# Patient Record
Sex: MALE | Race: WHITE
[De-identification: names, ages, dates, MRNs, and addresses within clinical notes are randomized per-mention and may not be internally consistent; named-entity substitution may affect disease eponyms.]

---

## 2024-03-17 ENCOUNTER — HOSPITAL ENCOUNTER (EMERGENCY)
Age: 59
Discharge: ANOTHER HOSPITAL | End: 2024-03-17
Payer: MEDICAID

## 2024-03-17 VITALS
DIASTOLIC BLOOD PRESSURE: 89 MMHG | TEMPERATURE: 98.4 F | RESPIRATION RATE: 22 BRPM | HEART RATE: 107 BPM | OXYGEN SATURATION: 96 % | SYSTOLIC BLOOD PRESSURE: 173 MMHG

## 2024-03-17 VITALS
OXYGEN SATURATION: 96 % | TEMPERATURE: 97.88 F | SYSTOLIC BLOOD PRESSURE: 178 MMHG | DIASTOLIC BLOOD PRESSURE: 64 MMHG | RESPIRATION RATE: 24 BRPM | HEART RATE: 94 BPM

## 2024-03-17 VITALS
HEART RATE: 100 BPM | RESPIRATION RATE: 20 BRPM | SYSTOLIC BLOOD PRESSURE: 205 MMHG | DIASTOLIC BLOOD PRESSURE: 113 MMHG | OXYGEN SATURATION: 96 % | TEMPERATURE: 98 F

## 2024-03-17 VITALS
SYSTOLIC BLOOD PRESSURE: 205 MMHG | OXYGEN SATURATION: 95 % | RESPIRATION RATE: 20 BRPM | HEART RATE: 100 BPM | DIASTOLIC BLOOD PRESSURE: 113 MMHG

## 2024-03-17 VITALS
HEART RATE: 96 BPM | RESPIRATION RATE: 18 BRPM | TEMPERATURE: 98.4 F | DIASTOLIC BLOOD PRESSURE: 98 MMHG | SYSTOLIC BLOOD PRESSURE: 199 MMHG | OXYGEN SATURATION: 95 %

## 2024-03-17 VITALS
OXYGEN SATURATION: 96 % | TEMPERATURE: 97.52 F | RESPIRATION RATE: 18 BRPM | HEART RATE: 99 BPM | DIASTOLIC BLOOD PRESSURE: 64 MMHG | SYSTOLIC BLOOD PRESSURE: 178 MMHG

## 2024-03-17 VITALS
OXYGEN SATURATION: 95 % | SYSTOLIC BLOOD PRESSURE: 181 MMHG | DIASTOLIC BLOOD PRESSURE: 101 MMHG | HEART RATE: 96 BPM | RESPIRATION RATE: 20 BRPM

## 2024-03-17 VITALS
SYSTOLIC BLOOD PRESSURE: 205 MMHG | TEMPERATURE: 98.96 F | DIASTOLIC BLOOD PRESSURE: 113 MMHG | HEART RATE: 100 BPM | OXYGEN SATURATION: 96 % | RESPIRATION RATE: 20 BRPM | BODY MASS INDEX: 48.2 KG/M2

## 2024-03-17 VITALS
SYSTOLIC BLOOD PRESSURE: 199 MMHG | HEART RATE: 95 BPM | RESPIRATION RATE: 14 BRPM | OXYGEN SATURATION: 96 % | DIASTOLIC BLOOD PRESSURE: 98 MMHG

## 2024-03-17 DIAGNOSIS — Z86.73: ICD-10-CM

## 2024-03-17 DIAGNOSIS — F17.290: ICD-10-CM

## 2024-03-17 DIAGNOSIS — E66.01: ICD-10-CM

## 2024-03-17 DIAGNOSIS — I63.233: Primary | ICD-10-CM

## 2024-03-17 LAB
ALANINE AMINOTRANSFER ALT/SGPT: 46 U/L (ref 16–61)
ALBUMIN SERPL-MCNC: 3.9 G/DL (ref 3.2–5)
ALKALINE PHOSPHATASE: 114 U/L (ref 45–117)
ANION GAP: 8 (ref 5–15)
APTT PPP: 26.6 SECONDS (ref 24.1–36.2)
AST(SGOT): 33 U/L (ref 15–37)
BILIRUB DIRECT SERPL-MCNC: 0.25 MG/DL (ref 0–0.3)
BILIRUB UR QL STRIP: 1 MG/DL
BUN SERPL-MCNC: 14 MG/DL (ref 7–18)
BUN/CREAT RATIO: 12.8 RATIO (ref 10–20)
CALCIUM SERPL-MCNC: 9.1 MG/DL (ref 8.5–10.1)
CARBON DIOXIDE: 24 MMOL/L (ref 21–32)
CHLORIDE: 108 MMOL/L (ref 98–107)
DEPRECATED RDW RBC: 42.8 FL (ref 35.1–43.9)
ERYTHROCYTE [DISTWIDTH] IN BLOOD: 13.2 % (ref 11.6–14.6)
EST GLOM FILT RATE - AFR AMER: 89 ML/MIN (ref 60–?)
ESTIMATED CREATININE CLEARANCE: 108.12 ML/MIN
GLOBULIN: 4.2 G/DL (ref 2.2–4.2)
GLUCOSE, DIPSTICK: 50 MG/DL
GLUCOSE: 177 MG/DL (ref 74–106)
HCT VFR BLD AUTO: 47.2 % (ref 40–54)
HGB BLD-MCNC: 15.7 G/DL (ref 13–16.5)
IMMATURE GRANULOCYTES COUNT: 0.14 X10^3/UL (ref 0–0)
KETONE-DIPSTICK: 150 MG/DL
LEUKOCYTE ESTERASE UR QL STRIP: 100 /UL
MCV RBC: 90.6 FL (ref 80–94)
MEAN CORP HGB CONC: 33.3 G/DL (ref 32–36)
MEAN PLATELET VOL.: 10.9 FL (ref 6.2–12)
MUCOUS THREADS URNS QL MICRO: (no result) /HPF
NRBC FLAGGED BY ANALYZER: 0 % (ref 0–5)
PLATELET # BLD: 173 K/MM3 (ref 150–450)
POTASSIUM: 4.2 MMOL/L (ref 3.5–5.1)
PROT UR QL STRIP.AUTO: 100 MG/DL
PROTHROMBIN TIME (PROTIME)PT.: 13.4 SECONDS (ref 11.7–14.9)
RBC # BLD AUTO: 5.21 M/MM3 (ref 4.6–6.2)
RBC UR QL: (no result) /HPF (ref 0–5)
RBC UR QL: 50 /UL
SP GR UR: 1.01 (ref 1–1.03)
SQUAMOUS URNS QL MICRO: (no result) /HPF (ref 0–5)
TROPONIN-I HS: 116 PG/ML (ref 3–78)
URINE PRESERVATIVE: (no result)
WBC # BLD AUTO: 16.9 K/MM3 (ref 4.4–11)

## 2024-03-17 PROCEDURE — 70450 CT HEAD/BRAIN W/O DYE: CPT

## 2024-03-17 PROCEDURE — 70498 CT ANGIOGRAPHY NECK: CPT

## 2024-03-17 PROCEDURE — 80048 BASIC METABOLIC PNL TOTAL CA: CPT

## 2024-03-17 PROCEDURE — 81001 URINALYSIS AUTO W/SCOPE: CPT

## 2024-03-17 PROCEDURE — 85025 COMPLETE CBC W/AUTO DIFF WBC: CPT

## 2024-03-17 PROCEDURE — 99285 EMERGENCY DEPT VISIT HI MDM: CPT

## 2024-03-17 PROCEDURE — 84484 ASSAY OF TROPONIN QUANT: CPT

## 2024-03-17 PROCEDURE — 85610 PROTHROMBIN TIME: CPT

## 2024-03-17 PROCEDURE — 80076 HEPATIC FUNCTION PANEL: CPT

## 2024-03-17 PROCEDURE — 93005 ELECTROCARDIOGRAM TRACING: CPT

## 2024-03-17 PROCEDURE — 70496 CT ANGIOGRAPHY HEAD: CPT

## 2024-03-17 PROCEDURE — 71045 X-RAY EXAM CHEST 1 VIEW: CPT

## 2024-03-17 PROCEDURE — 51702 INSERT TEMP BLADDER CATH: CPT

## 2024-03-17 PROCEDURE — 85730 THROMBOPLASTIN TIME PARTIAL: CPT

## 2024-03-17 PROCEDURE — A4216 STERILE WATER/SALINE, 10 ML: HCPCS

## 2024-03-17 NOTE — EX.ED.DYSGE1
HPI
History of Present Illness
Chief Complaint: Unresponsive
Informant: family and EMS
Narrative
Narrative: 
58-year-old male presenting to the emergency room with the chief complaint of unresponsiveness.  EMS brings the patient in after his mother whom he lives with called EMS for being unresponsive.  Mom states that at 1900 hrs. last night the patient 
walked by her stating that he was going to go to bed.  He did seem to be at his baseline at that time.  Today he had not gotten out of bed mom heard him snoring and when checked on him found that he was incontinent of urine and was unable to be 
awoken.  EMS was called.  She notes that he has had prior strokes with left-sided deficits in the past.  His wife  several years ago and since that time he is reportedly become depressed and not taking care of himself or seeing a doctor.  He has 
2 adult children whom he does not have a relationship with per his mother.

[facility]
Medical History (Reviewed 24 @ 15:47 by Dr. Andrew Ro DO)

Stroke




Allergy/AdvReac Type Severity Reaction Status Date / Time
Penicillins Allergy Severe Anaphylaxis Verified 24 14:42


Social History (Reviewed 24 @ 15:47 by Dr. Andrew Ro DO)
Smoking Status:  Current every day smoker tobacco type: pipe 



ROS
ROS ED
Review of Systems
ROS Unobtainable: due to mental status

EXAM
Physical Exam
Const
Vital Signs: 



 24
14:29 24
14:28 24
14:38
Temperature 98 F 99 F 
Temperature Source Temporal Temporal 
Pulse Rate 100 100 100
Respiratory Rate 20 H 20 H 20 H
Blood Pressure 205/113 H 205/113 H 205/113 H
Blood Pressure Mean 143 143 143
Pulse Ox 96 96 95
Oxygen Delivery Method Room Air Room Air 
Oxygen Flow Rate (L/min)   

 24
14:38 24
14:39 24
14:58
Temperature   
Temperature Source   
Pulse Rate  96 95
Respiratory Rate  20 H 14
Blood Pressure  181/101 H 199/98 H
Blood Pressure Mean  127 131
Pulse Ox  95 96
Oxygen Delivery Method Room Air Room Air Room Air
Oxygen Flow Rate (L/min)   

 24
15:08 24
15:30
Temperature 98.4 F 98.4 F
Temperature Source Temporal Temporal
Pulse Rate 96 107 H
Respiratory Rate 18 22 H
Blood Pressure 199/98 H 173/89 H
Blood Pressure Mean 131 117
Pulse Ox 95 96
Oxygen Delivery Method Room Air Nasal Cannula
Oxygen Flow Rate (L/min)  2



Positive well nourished, well developed and obese
General Appearance ED: well developed
Nutritional Appearance: obese
HEENT
Reports normocephalic, head/scalp atraumatic and moist mucous membranes
Eyes
Eyes Narrative: 
There appears to be an eye deviation towards the right.  I am able to get his eyes to start to come towards midline.  I question whether or not he has a vision deficit on the right eye as he does not blink when I can front him.  Pupils are 3-2 
bilaterally
Neck
no lymphadenopathy, supple and no JVD
Resp
Resp Narrative: 
Lung auscultation shows upper airway rhonchi.  He appears to have a degree of labored breathing while laying flat.  
Cardio
regular rate, regular rhythm and no murmurs
GI
normal to inspection, nondistended, normoactive bowel sounds and non-tender
Palpation: soft
Back/Spine
no CVA tenderness and normal ROM
Extremity
General Extremety ED: Yes edema
General Extremity: edema bilateral lower extremity Details: mild
Neuro
Sensorium / Orientation: lethargic
Skin
no rashes or lesions noted

MDM
MDM
MDM Narrative
Medical decision making narrative: 
After the initial evaluation in the resuscitation room and speaking with his mother and determining that he was last seen at 1900 hrs. a stroke team was called.  CT on my read shows changes on the CT CTA consistent with LVO and acute stroke.  I 
spoke with the family.  There was a long discussion regarding whether or not he would want any treatment what type of treatment he would want and how best to proceed.  I spoke with [facility] neurology and with teleradiology several times.  My independent 
interpretation of the chest x-ray is no definitive infiltrate.  White count is elevated 16.9 hemoglobin 15.7 and platelet count of 173.  Creatinine 1.09.  Glucose of 177.  Troponin 116.  His EKG appears to be a sinus rhythm but I cannot fully 
exclude some intermittent A-fib.  Patient's blood pressures have been elevated but not requiring any treatment.  The plan at this point is to transfer the patient to [facility] for thrombectomy.  Due to weather patterns air assets are not available.  [facility] 
requested rectal aspirin.  At this point I do not believe the patient is in needing of intubation.  His breathing has improved while he is in a 30 degree angle.

On a social standpoint I did express to [facility] his depression and the fact that he does not currently have Medicare or Medicaid and that he would most likely benefit and the family would benefit from speaking with social work and mental health 
professionals.
History & Record Review
Discussion w/independent historian: EMS personnel and Family
Lab Data
Attestation: I reviewed the patient's lab results.
Labs: 

Laboratory Results - last 24 hr

  24
  14:30
WBC  16.9 H
RBC  5.21
Hgb  15.7
Hct  47.2
MCV  90.6
MCH  30.1
MCHC  33.3
RDW Std Deviation  42.8
RDW Coeff of Paula  13.2
Plt Count  173
MPV  10.9
Immature Gran % (Auto)  0.800
Neut % (Auto)  90.4 H
Lymph % (Auto)  4.3 L
Mono % (Auto)  4.1
Eos % (Auto)  0.0
Baso % (Auto)  0.4
Absolute Neuts (auto)  15.3 H
Absolute Lymphs (auto)  0.72 L
Nucleated RBC %  0
PT  13.4
INR  1.0
APTT  26.6
Sodium  140
Potassium  4.2
Chloride  108 H
Carbon Dioxide  24.0
Anion Gap  8
BUN  14
Creatinine  1.09
Estim Creat Clear Calc  108.12
Est GFR (MDRD) Af Amer  89
Est GFR (MDRD) Non-Af  74
BUN/Creatinine Ratio  12.8
Glucose  177 H
Calcium  9.1
Total Bilirubin  0.80
Direct Bilirubin  0.25
AST  33
ALT  46
Alkaline Phosphatase  114
Troponin I High Sens  116 H
Total Protein  8.1
Albumin  3.9
Globulin  4.2



Radiography
Diagnostic Testing: 

Clinical Impression(s) from Imaging Studies

Brain CT  24 14:38
IMPRESSION:
There is a left MCA territory and right frontal parietal lobe acute
ischemic infarction.
 
Critical finding called and case discussed. 3/17/2024 3:01 PM Jayjay MONTOYA : The above Results were Read Back by Jb Humphrey MD to Andrew Ro DO, and understanding confirmed on 2024 15:01:27 (ET).
 
Electronically Signed:
Jb Humphrey MD
 at 15:02 EDT
Reading Location ID and State: [address] / [place]
Tel [phone], Service support  [phone], Fax [phone]
 

ADDENDUM: 24 1509
IMPRESSION:
There is a left MCA territory and right frontal parietal lobe acute
ischemic infarction.
 
Critical finding called and case discussed. 3/17/2024 3:01 PM Jayjay MONTOYA : The above Results were Read Back by Jb Humphrey MD to Andrew Ro DO, and understanding confirmed on 2024 15:01:27 (ET).
 
Electronically Signed:
Jb Humphrey MD
 at 15:02 EDT
Reading Location ID and State: [address] / [place]
Tel [phone], Service support  [phone], Fax [phone]
 


Head/Neck CTA  24 14:38
IMPRESSION:
 
1.  There is severe stenosis of the RIGHT A2 segments and distal branches
of the anterior cerebral arteries.
 
2.  There is mild atherosclerotic plaque formation of the origin of the
right internal carotid artery with less than 50% cross sectional diameter
stenosis. ALL ABOVE CRITERIA BY NASCET.
 
3.  There is mild atherosclerotic plaque formation of the origin of the
left internal carotid artery with less than 50% cross sectional diameter
stenosis. ALL ABOVE CRITERIA BY NASCET.
 
4.  There is calcified plaque formation of the right cavernous carotid
artery, with a mild stenosis (less than 50%). ALL ABOVE CRITERIA BY NASCET.
 
5.  There is calcified plaque formation of the left cavernous carotid
artery, with a mild stenosis (less than 50%). ALL ABOVE CRITERIA BY NASCET.
 
6.  There is irregularity of the left M1 branches with occlusion of the
left M2 branch.
 
Electronically Signed:
Jb Humphrey MD
 at 15:09 EDT
Reading Location ID and State: [address] / [place]
Tel [phone], Service support  [phone], Fax [phone]
 

ADDENDUM: 24 1532
IMPRESSION:
 
1.  There is severe stenosis of the RIGHT A2 segments and distal branches
of the anterior cerebral arteries.
 
2.  There is mild atherosclerotic plaque formation of the origin of the
right internal carotid artery with less than 50% cross sectional diameter
stenosis. ALL ABOVE CRITERIA BY NASCET.
 
3.  There is mild atherosclerotic plaque formation of the origin of the
left internal carotid artery with less than 50% cross sectional diameter
stenosis. ALL ABOVE CRITERIA BY NASCET.
 
4.  There is calcified plaque formation of the right cavernous carotid
artery, with a mild stenosis (less than 50%). ALL ABOVE CRITERIA BY NASCET.
 
5.  There is calcified plaque formation of the left cavernous carotid
artery, with a mild stenosis (less than 50%). ALL ABOVE CRITERIA BY NASCET.
 
6.  There is irregularity of the left M1 branches with occlusion of the
left M2 branch.
 
N.B. : The above Results were Read Back by Jb Humphrey MD to Andrew Ro DO, and understanding confirmed on 2024 15:25:28 (ET).
 
Electronically Signed:
Jb Humphrey MD
 at 15:09 EDT
Reading Location ID and State: [address] / [place]
Tel [phone], Service support  [phone], Fax [phone]
 


Chest X-Ray  24 15:05
IMPRESSION:
No radiographic evidence of acute cardiopulmonary disease.
 
Electronically Signed:
Jb Humphrey MD
 at 15:21 EDT
Reading Location ID and State: [address] / [place]
Tel [phone], Service support  [phone], Fax [phone]
 




EKG
Initial EKG: 
      Attestation: I personally reviewed and interpreted this EKG as follows:
      Comments: Sinus rhythm ventricular rate of 96 bpm
Management
Discussion w/another healthcare provider: Consultant and Radiologist

Critical Care Time
Critical Care Time: Yes
Critical care time (excluding procedures): 30-74 minutes (35 min), Including time spent:, Discussing w/Patient &/or Family/Care Giver, Discussing w/Consultants (Radiology x 2 [facility] Neurology x 2 Transfer Line x ), Arranging Admission or Transfer and 
Performing Direct Patient Care at Bedside

Discharge Plan
Triage
Chief Complaint: Unresponsive

ED Provider: Andrew Ro

Dx/Rx/DC Orders
Clinical Impression:
 Acute stroke due to ischemia, Morbid obesity, Hypertension


Primary Care Provider: Care Physician,No Primary

Referrals:
Care Physician,No Primary [Primary Care Provider] - 

Disposition
***Disposition***: Acute Care Hospital

Discharge Location: [facility]


NIHSS
NIHSS
1a. Level of Consciousness: Alert; keenly responsive
1b. LOC Questions: Answers neither question correctly.
1c. LOC Commands: Performs both tasks correctly.
2. Best Gaze: Forced deviation,
3. Visual: Complete hemianopia
4. Facial Palsy: Complete paralysis of one or both sides
5a. Left Arm: Drift; arm drifts downward but doesn?t hit the bed
5b. Right Arm: No effort against gravity; arm falls
6a. Left Leg: Some effort against gravity;
6b. Right Leg: No effort against gravity; leg falls to bed immediately
7. Limb Ataxia: Absent
8. Sensory: Severe to total sensory loss;
9. Best Language: Mute, global aphasia;
10. Dysarthria: UN=Intubated or other physical barrier, explain:
11. Extinction and Inattention: Profound arielle-inattention or extinction to more than one modality;
Total: 25
Stroke Questions
Stroke Team Activated: Yes
a.Reviewed Inclusion/Exclusion criteria: Yes
Was Patient considered for Endovascular Intervention?: No (per family - patient would not desire that)
IV Thrombolytic Administered: No
Critical care time (excluding procedures): 30-74 minutes (35 minutes)

## 2024-03-17 NOTE — CT_ITS
REPORT-ID:[id number] 
 
STUDY:  CT BRAIN WITHOUT CONTRAST 
 
REASON FOR EXAM:   Male, 58 years old. Neuro deficit, acute, stroke 
suspected 
 
Individualized dose optimization techniques were used for this CT. 
 
TECHNIQUE:   Transaxial CT imaging of the brain was performed without 
administration of intravenous contrast material. 
 
COMPARISON:   None 
___________________________________ 
FINDINGS: 
There are calcifications noted in the distal vertebral arteries. There are 
calcifications noted in the cavernous carotid arteries. This is consistent 
for atherosclerotic disease.  Normal calvarium. Normal soft tissues. 
 
There is mild cerebral atrophy with widening of the extra-axial spaces and 
ventricular dilatation. There are areas of decreased attenuation within the 
white matter tracts of the supratentorial brain, consistent with 
microvascular disease changes.  Normal basal ganglia and thalami.  Normal 
brainstem.  Old right cerebellar infarct. 
 
There is no intracranial hemorrhage.  There is a left MCA territory and 
right frontal parietal lobe acute ischemic infarction. 
 
Normal visualized paranasal sinuses. 
 
ASPECTS Score for Acute Strokes: 8/10 
___________________________________ 
 
ORDER #: [id number] CT/STROKE Brain/Head without Cont  
IMPRESSION:  
There is a left MCA territory and right frontal parietal lobe acute  
ischemic infarction.  
 
  
Critical finding called and case discussed. 3/17/2024 3:01 PM Jayjay MONTOYA : The above Results were Read Back by Jb Humphrey MD to Andrew Ro DO, and understanding confirmed on 03/17/2024 15:01:27 (ET).  
 
  
Electronically Signed:  
Jb Humphrey MD  
2024/03/17 at 15:02 EDT  
Reading Location ID and State: [address] / [place]  
Tel [phone], Service support  [phone], Fax [phone]

## 2024-03-17 NOTE — EKG12_ITS
Test Reason : POSS STROKE 
Blood Pressure : ***/*** mmHG 
Vent. Rate : 096 BPM     Atrial Rate : 000 BPM 
   P-R Int : 000 ms          QRS Dur : 080 ms 
    QT Int : 358 ms       P-R-T Axes : 000 091 034 degrees 
   QTc Int : 452 ms 
  
NSR 
Rightward axis 
Abnormal ECG 
No previous ECGs available 
Confirmed by DAISY WALTERS, PHIL ([id number]),  DIOGO THRASHER ([id number]) on 3/19/2024 10:47:58 AM 
  
Referred By:             Confirmed By:PHIL VILLA MD

## 2024-03-17 NOTE — ED.RN
CALLED [facility] @ 1520 TO SET UP TRANSPORT FOR PT IF THEY WERE FLYING. THEY CALLED BACK AT 1527 TO TURN DOWN THE FLIGHT DUE TO WEATHER. I ASKED AT THIS TIME TO SET UP [facility] TRANSPORT INSTEAD. THEY STATED THEY WOULD CHECK WITH THEIR 
SQUADS AND CALL US BACK. I CALLED [facility] @ 1530 TO ARRANGE TRANSPORT FOR PT WHILE AWAITING [email or web address]'S RESPONSE. DISPATCH GAVE US AN ETA OF 15 MIN (1545). I SPOKE TO [email or web address] DISPATCH AT 1534 TO LET THEM KNOW WE HAD TRANSPORT 
ARRANGED.

## 2024-03-17 NOTE — CT_ITS
**We are attempting to reach an attending provider to discuss findings. An 
addendum with communication details will be sent when the communication is 
complete. ** 
 
REPORT-ID:[id number] 
 
EXAM:  CT ANGIOGRAPHY HEAD AND NECK WITH INTRAVENOUS CONTRAST 
 
CLINICAL INDICATION:  Neuro deficit, acute, stroke suspected 
 
TECHNIQUE:  [place] [facility] [place]/head and neck CT angiography protocol 
performed with intravenous contrast.  This CT exam was performed using one 
or more of the following dose reduction techniques:  automated exposure 
control, adjustment of the mA and/or kV according to patient size, and/or 
use of iterative reconstruction technique.  MIP reconstructed images were 
created and reviewed. 
 
CONTRAST:  IV 100mL [id number] 
 
RADIATION DOSE:  CTDIvol = 21.17 mGy, DLP = 862.18 mGy-cm 
 
COMPARISON:  No relevant prior studies available. 
 
FINDINGS: 
HEAD: 
 
RIGHT ANTERIOR CEREBRAL ARTERY:  There is severe stenosis of the RIGHT A2 
segments and distal branches of the anterior cerebral arteries.  Anterior 
communicating artery is present.  No aneurysm. 
 
RIGHT MIDDLE CEREBRAL ARTERY:  Unremarkable.  No occlusion or significant 
stenosis.  No aneurysm. 
 
RIGHT POSTERIOR CEREBRAL ARTERY:  Unremarkable.  No occlusion or 
significant stenosis.  No aneurysm. 
 
RIGHT INTRACRANIAL INTERNAL CAROTID ARTERY:  Unremarkable.  No significant 
stenosis.  No dissection or occlusion. 
 
RIGHT INTRACRANIAL VERTEBRAL ARTERY:  Unremarkable.  No significant 
stenosis.  No dissection or occlusion. 
 
LEFT ANTERIOR CEREBRAL ARTERY:  Unremarkable.  No occlusion or significant 
stenosis.  No aneurysm. 
 
LEFT MIDDLE CEREBRAL ARTERY:  There is irregularity of the left M1 branches 
with occlusion of the left M2 branch. There is irregularity of the left M1 
and M2 branches with minimal luminal narrowing, suggesting atherosclerotic 
plaque formation.  No aneurysm. 
 
LEFT POSTERIOR CEREBRAL ARTERY:  Unremarkable.  No occlusion or significant 
stenosis.  No aneurysm. 
 
LEFT INTRACRANIAL INTERNAL CAROTID ARTERY:  Unremarkable.  No significant 
stenosis.  No dissection or occlusion. 
 
LEFT INTRACRANIAL VERTEBRAL ARTERY:  Unremarkable.  No significant 
stenosis.  No dissection or occlusion. 
 
BASILAR ARTERY:  Unremarkable.  No occlusion or significant stenosis.  No 
aneurysm. 
 
OTHER VASCULATURE:  There is calcified plaque formation of the right 
cavernous carotid artery, with a mild stenosis (less than 50%). ALL ABOVE 
CRITERIA BY NASCET.  There is calcified plaque formation of the left 
cavernous carotid artery, with a mild stenosis (less than 50%). ALL ABOVE 
CRITERIA BY NASCET.  No vascular malformation. 
 
NECK: 
 
RIGHT COMMON CAROTID ARTERY:  Unremarkable.  No significant stenosis.  No 
dissection or occlusion. 
 
RIGHT EXTRACRANIAL INTERNAL CAROTID ARTERY:  There is mild atherosclerotic 
plaque formation of the origin of the right internal carotid artery with 
less than 50% cross sectional diameter stenosis. ALL ABOVE CRITERIA BY 
NASCET.  No dissection or occlusion. 
 
RIGHT EXTERNAL CAROTID ARTERY:  Unremarkable.  No occlusion. 
 
RIGHT EXTRACRANIAL VERTEBRAL ARTERY:  Unremarkable.  No significant 
stenosis.  No dissection or occlusion. 
 
LEFT COMMON CAROTID ARTERY:  Unremarkable.  No significant stenosis.  No 
dissection or occlusion. 
 
LEFT EXTRACRANIAL INTERNAL CAROTID ARTERY:  There is mild atherosclerotic 
plaque formation of the origin of the left internal carotid artery with 
less than 50% cross sectional diameter stenosis. ALL ABOVE CRITERIA BY 
NASCET.  No dissection or occlusion. 
 
LEFT EXTERNAL CAROTID ARTERY:  Unremarkable.  No occlusion. 
 
LEFT EXTRACRANIAL VERTEBRAL ARTERY:  Unremarkable.  No significant 
stenosis.  No dissection or occlusion. 
 
BRACHIOCEPHALIC AND SUBCLAVIAN ARTERIES:  Unremarkable as visualized.  No 
occlusion or significant stenosis. 
 
LUNG APICES:  Unremarkable as visualized. 
 
HEAD and NECK: 
 
BONES/JOINTS:  There are degenerative findings of the cervical spine.  No 
discrete lytic or blastic abnormalities. 
 
SOFT TISSUES:  Unremarkable. 
 
OTHER FINDINGS:  Post-processing of the angiographic images was performed, 
with axial imaging and 3D reconstruction. MIPS images were obtained. 
 
CAROTID STENOSIS REFERENCE USING NASCET CRITERIA: 
% ICA stenosis = (1 - narrowest ICA diameter/diameter of distal cervical 
ICA) x 100. 
Mild - <50% stenosis. 
Moderate - 50-69% stenosis. 
Severe - 70-94% stenosis. 
Near occlusion - 95-99% stenosis. 
Occluded - 100% stenosis. 
 
ORDER #: [id number] CT/STROKE CTA Head AND Neck W/Con  
IMPRESSION:  
 
  
1.  There is severe stenosis of the RIGHT A2 segments and distal branches  
of the anterior cerebral arteries.  
 
  
2.  There is mild atherosclerotic plaque formation of the origin of the  
right internal carotid artery with less than 50% cross sectional diameter  
stenosis. ALL ABOVE CRITERIA BY NASCET.  
 
  
3.  There is mild atherosclerotic plaque formation of the origin of the  
left internal carotid artery with less than 50% cross sectional diameter  
stenosis. ALL ABOVE CRITERIA BY NASCET.  
 
  
4.  There is calcified plaque formation of the right cavernous carotid  
artery, with a mild stenosis (less than 50%). ALL ABOVE CRITERIA BY NASCET.  
 
  
5.  There is calcified plaque formation of the left cavernous carotid  
artery, with a mild stenosis (less than 50%). ALL ABOVE CRITERIA BY NASCET.  
 
  
6.  There is irregularity of the left M1 branches with occlusion of the  
left M2 branch.  
 
  
Electronically Signed:  
Jb Humphrey MD  
2024/03/17 at 15:09 EDT  
Reading Location ID and State: [address] / [place]  
Tel [phone], Service support  [phone], Fax [phone]

## 2024-03-17 NOTE — RAD_ITS
REPORT-ID:[id number] 
 
EXAM:  XR CHEST, 1 VIEW 
 
CLINICAL INDICATION:  Neuro deficit, acute, stroke suspected 
 
TECHNIQUE:  Frontal view of the chest. 
 
COMPARISON:  No relevant prior studies available. 
 
FINDINGS: 
 
LUNGS AND PLEURAL SPACES:  Unremarkable.  No consolidation or edema.  No 
pneumothorax.  No effusion. 
 
HEART:  Unremarkable.  Cardiac silhouette not enlarged. 
 
MEDIASTINUM:  Central airways and mediastinal contour are unremarkable. 
 
BONES/JOINTS:  Unremarkable.  No acute fracture. 
 
SOFT TISSUES:  Unremarkable. 
 
ORDER #: [id number] RAD/Chest 1 View  
IMPRESSION:  
No radiographic evidence of acute cardiopulmonary disease.  
 
  
Electronically Signed:  
Jb Humphrey MD  
2024/03/17 at 15:21 EDT  
Reading Location ID and State: [address] / [place]  
Tel [phone], Service support  [phone], Fax [phone]

## 2024-03-17 NOTE — EDS_ITS
HPI    
History of Present Illness    
Chief Complaint: Unresponsive    
Informant: family and EMS    
Narrative    
Narrative:     
58-year-old male presenting to the emergency room with the chief complaint of   
unresponsiveness.  EMS brings the patient in after his mother whom he lives with  
called EMS for being unresponsive.  Mom states that at 1900 hrs. last night the   
patient walked by her stating that he was going to go to bed.  He did seem to be  
at his baseline at that time.  Today he had not gotten out of bed mom heard him   
snoring and when checked on him found that he was incontinent of urine and was   
unable to be awoken.  EMS was called.  She notes that he has had prior strokes   
with left-sided deficits in the past.  His wife  several years ago and since  
that time he is reportedly become depressed and not taking care of himself or   
seeing a doctor.  He has 2 adult children whom he does not have a relationship   
with per his mother.    
    
[facility]    
Medical History (Reviewed 24 @ 15:47 by Dr. Andrew Ro DO)    
    
Stroke    
    
    
                                            
    
    
    
Allergy/AdvReac Type Severity Reaction Status Date / Time    
     
Penicillins Allergy Severe Anaphylaxis Verified 24 14:42    
    
    
    
Social History (Reviewed 24 @ 15:47 by Dr. Andrew Ro DO)    
Smoking Status:  Current every day smoker tobacco type: pipe     
    
    
    
ROS    
ROS ED    
Review of Systems    
ROS Unobtainable: due to mental status    
    
EXAM    
Physical Exam    
Const    
Vital Signs:     
    
                                            
    
    
    
 24    
14:29 24    
14:28 24    
14:38    
     
Temperature 98 F 99 F     
     
Temperature Source Temporal Temporal     
     
Pulse Rate 100 100 100    
     
Respiratory Rate 20 H 20 H 20 H    
     
Blood Pressure 205/113 H 205/113 H 205/113 H    
     
Blood Pressure Mean 143 143 143    
     
Pulse Ox 96 96 95    
     
Oxygen Delivery Method Room Air Room Air     
     
Oxygen Flow Rate (L/min)       
    
    
    
    
 24    
14:38 24    
14:39 24    
14:58    
     
Temperature       
     
Temperature Source       
     
Pulse Rate  96 95    
     
Respiratory Rate  20 H 14    
     
Blood Pressure  181/101 H 199/98 H    
     
Blood Pressure Mean  127 131    
     
Pulse Ox  95 96    
     
Oxygen Delivery Method Room Air Room Air Room Air    
     
Oxygen Flow Rate (L/min)       
    
    
    
    
 24    
15:08 24    
15:30    
     
Temperature 98.4 F 98.4 F    
     
Temperature Source Temporal Temporal    
     
Pulse Rate 96 107 H    
     
Respiratory Rate 18 22 H    
     
Blood Pressure 199/98 H 173/89 H    
     
Blood Pressure Mean 131 117    
     
Pulse Ox 95 96    
     
Oxygen Delivery Method Room Air Nasal Cannula    
     
Oxygen Flow Rate (L/min)  2    
    
    
    
    
Positive well nourished, well developed and obese    
General Appearance ED: well developed    
Nutritional Appearance: obese    
HEENT    
Reports normocephalic, head/scalp atraumatic and moist mucous membranes    
Eyes    
Eyes Narrative:     
There appears to be an eye deviation towards the right.  I am able to get his   
eyes to start to come towards midline.  I question whether or not he has a   
vision deficit on the right eye as he does not blink when I can front him.    
Pupils are 3-2 bilaterally    
Neck    
no lymphadenopathy, supple and no JVD    
Resp    
Resp Narrative:     
Lung auscultation shows upper airway rhonchi.  He appears to have a degree of l  
abored breathing while laying flat.      
Cardio    
regular rate, regular rhythm and no murmurs    
GI    
normal to inspection, nondistended, normoactive bowel sounds and non-tender    
Palpation: soft    
Back/Spine    
no CVA tenderness and normal ROM    
Extremity    
General Extremety ED: Yes edema    
General Extremity: edema bilateral lower extremity Details: mild    
Neuro    
Sensorium / Orientation: lethargic    
Skin    
no rashes or lesions noted    
    
MDM    
MDM    
MDM Narrative    
Medical decision making narrative:     
After the initial evaluation in the resuscitation room and speaking with his   
mother and determining that he was last seen at 1900 hrs. a stroke team was   
called.  CT on my read shows changes on the CT CTA consistent with LVO and acute  
stroke.  I spoke with the family.  There was a long discussion regarding whether  
or not he would want any treatment what type of treatment he would want and how   
best to proceed.  I spoke with [facility] neurology and with teleradiology several   
times.  My independent interpretation of the chest x-ray is no definitive   
infiltrate.  White count is elevated 16.9 hemoglobin 15.7 and platelet count of   
173.  Creatinine 1.09.  Glucose of 177.  Troponin 116.  His EKG appears to be a   
sinus rhythm but I cannot fully exclude some intermittent A-fib.  Patient's   
blood pressures have been elevated but not requiring any treatment.  The plan at  
this point is to transfer the patient to [facility] for thrombectomy.  Due to weather   
patterns air assets are not available.  [facility] requested rectal aspirin.  At this   
point I do not believe the patient is in needing of intubation.  His breathing   
has improved while he is in a 30 degree angle.    
    
On a social standpoint I did express to [facility] his depression and the fact that he   
does not currently have Medicare or Medicaid and that he would most likely   
benefit and the family would benefit from speaking with social work and mental   
health professionals.    
History & Record Review    
Discussion w/independent historian: EMS personnel and Family    
Lab Data    
Attestation: I reviewed the patient's lab results.    
Labs:     
    
                         Laboratory Results - last 24 hr    
    
    
    
  24    
    
  14:30    
     
WBC  16.9 H    
     
RBC  5.21    
     
Hgb  15.7    
     
Hct  47.2    
     
MCV  90.6    
     
MCH  30.1    
     
MCHC  33.3    
     
RDW Std Deviation  42.8    
     
RDW Coeff of Paula  13.2    
     
Plt Count  173    
     
MPV  10.9    
     
Immature Gran % (Auto)  0.800    
     
Neut % (Auto)  90.4 H    
     
Lymph % (Auto)  4.3 L    
     
Mono % (Auto)  4.1    
     
Eos % (Auto)  0.0    
     
Baso % (Auto)  0.4    
     
Absolute Neuts (auto)  15.3 H    
     
Absolute Lymphs (auto)  0.72 L    
     
Nucleated RBC %  0    
     
PT  13.4    
     
INR  1.0    
     
APTT  26.6    
     
Sodium  140    
     
Potassium  4.2    
     
Chloride  108 H    
     
Carbon Dioxide  24.0    
     
Anion Gap  8    
     
BUN  14    
     
Creatinine  1.09    
     
Estim Creat Clear Calc  108.12    
     
Est GFR (MDRD) Af Amer  89    
     
Est GFR (MDRD) Non-Af  74    
     
BUN/Creatinine Ratio  12.8    
     
Glucose  177 H    
     
Calcium  9.1    
     
Total Bilirubin  0.80    
     
Direct Bilirubin  0.25    
     
AST  33    
     
ALT  46    
     
Alkaline Phosphatase  114    
     
Troponin I High Sens  116 H    
     
Total Protein  8.1    
     
Albumin  3.9    
     
Globulin  4.2    
    
    
    
    
Radiography    
Diagnostic Testing:     
    
Clinical Impression(s) from Imaging Studies    
    
Brain CT  24 14:38    
IMPRESSION:    
There is a left MCA territory and right frontal parietal lobe acute    
ischemic infarction.    
     
Critical finding called and case discussed. 3/17/2024 3:01 PM Jayjay MONTOYA : The above Results were Read Back by Jb Humphrey MD to Andrew Ro DO, and understanding confirmed on 2024 15:01:27 (ET).    
     
Electronically Signed:    
Jb Humphrey MD    
 at 15:02 EDT    
Reading Location ID and State: [address] / [place]    
Tel [phone], Service support  [phone], Fax [phone]    
     
    
ADDENDUM: 24 1509    
IMPRESSION:    
There is a left MCA territory and right frontal parietal lobe acute    
ischemic infarction.    
     
Critical finding called and case discussed. 3/17/2024 3:01 PM Jayjay MONTOYA : The above Results were Read Back by Jb Humphrey MD to Andrew Ro DO, and understanding confirmed on 2024 15:01:27 (ET).    
     
Electronically Signed:    
Jb Humphrey MD    
 at 15:02 EDT    
Reading Location ID and State: [address] / [place]    
Tel [phone], Service support  [phone], Fax [phone]    
     
    
    
Head/Neck CTA  24 14:38    
IMPRESSION:    
     
1.  There is severe stenosis of the RIGHT A2 segments and distal branches    
of the anterior cerebral arteries.    
     
2.  There is mild atherosclerotic plaque formation of the origin of the    
right internal carotid artery with less than 50% cross sectional diameter    
stenosis. ALL ABOVE CRITERIA BY NASCET.    
     
3.  There is mild atherosclerotic plaque formation of the origin of the    
left internal carotid artery with less than 50% cross sectional diameter    
stenosis. ALL ABOVE CRITERIA BY NASCET.    
     
4.  There is calcified plaque formation of the right cavernous carotid    
artery, with a mild stenosis (less than 50%). ALL ABOVE CRITERIA BY NASCET.    
     
5.  There is calcified plaque formation of the left cavernous carotid    
artery, with a mild stenosis (less than 50%). ALL ABOVE CRITERIA BY NASCET.    
     
6.  There is irregularity of the left M1 branches with occlusion of the    
left M2 branch.    
     
Electronically Signed:    
Jb Humphrey MD    
 at 15:09 EDT    
Reading Location ID and State: [address] / [place]    
Tel [phone], Service support  [phone], Fax [phone]    
     
    
ADDENDUM: 24 1532    
IMPRESSION:    
     
1.  There is severe stenosis of the RIGHT A2 segments and distal branches    
of the anterior cerebral arteries.    
     
2.  There is mild atherosclerotic plaque formation of the origin of the    
right internal carotid artery with less than 50% cross sectional diameter    
stenosis. ALL ABOVE CRITERIA BY NASCET.    
     
3.  There is mild atherosclerotic plaque formation of the origin of the    
left internal carotid artery with less than 50% cross sectional diameter    
stenosis. ALL ABOVE CRITERIA BY NASCET.    
     
4.  There is calcified plaque formation of the right cavernous carotid    
artery, with a mild stenosis (less than 50%). ALL ABOVE CRITERIA BY NASCET.    
     
5.  There is calcified plaque formation of the left cavernous carotid    
artery, with a mild stenosis (less than 50%). ALL ABOVE CRITERIA BY NASCET.    
     
6.  There is irregularity of the left M1 branches with occlusion of the    
left M2 branch.    
     
N.B. : The above Results were Read Back by Jb Humphrey MD to Andrew Ro DO, and understanding confirmed on 2024 15:25:28 (ET).    
     
Electronically Signed:    
Jb Humphrey MD    
 at 15:09 EDT    
Reading Location ID and State: [address] / [place]    
Tel [phone], Service support  [phone], Fax [phone]    
     
    
    
Chest X-Ray  24 15:05    
IMPRESSION:    
No radiographic evidence of acute cardiopulmonary disease.    
     
Electronically Signed:    
Jb Humphrey MD    
 at 15:21 EDT    
Reading Location ID and State: [address] / [place]    
Tel [phone], Service support  [phone], Fax [phone]    
     
    
    
    
    
EKG    
Initial EKG:     
      Attestation: I personally reviewed and interpreted this EKG as follows:    
      Comments: Sinus rhythm ventricular rate of 96 bpm    
Management    
Discussion w/another healthcare provider: Consultant and Radiologist    
    
Critical Care Time    
Critical Care Time: Yes    
Critical care time (excluding procedures): 30-74 minutes (35 min), Including   
time spent:, Discussing w/Patient &/or Family/Care Giver, Discussing   
w/Consultants (Radiology x 2 [facility] Neurology x 2 Transfer Line x ), Arranging   
Admission or Transfer and Performing Direct Patient Care at Bedside    
    
Discharge Plan    
Triage    
Chief Complaint: Unresponsive    
    
ED Provider: Andrew Ro    
    
Dx/Rx/DC Orders    
Clinical Impression:    
 Acute stroke due to ischemia, Morbid obesity, Hypertension    
    
    
Primary Care Provider: Care Physician,No Primary    
    
Referrals:    
Care Physician,No Primary [Primary Care Provider] -     
    
Disposition    
***Disposition***: Acute Care Hospital    
    
Discharge Location: [facility]    
    
    
NIHSS    
NIHSS    
1a. Level of Consciousness: Alert; keenly responsive    
1b. LOC Questions: Answers neither question correctly.    
1c. LOC Commands: Performs both tasks correctly.    
2. Best Gaze: Forced deviation,    
3. Visual: Complete hemianopia    
4. Facial Palsy: Complete paralysis of one or both sides    
5a. Left Arm: Drift; arm drifts downward but doesn?t hit the bed    
5b. Right Arm: No effort against gravity; arm falls    
6a. Left Leg: Some effort against gravity;    
6b. Right Leg: No effort against gravity; leg falls to bed immediately    
7. Limb Ataxia: Absent    
8. Sensory: Severe to total sensory loss;    
9. Best Language: Mute, global aphasia;    
10. Dysarthria: UN=Intubated or other physical barrier, explain:    
11. Extinction and Inattention: Profound arielle-inattention or extinction to more   
than one modality;    
Total: 25    
Stroke Questions    
Stroke Team Activated: Yes    
a.Reviewed Inclusion/Exclusion criteria: Yes    
Was Patient considered for Endovascular Intervention?: No (per family - patient   
would not desire that)    
IV Thrombolytic Administered: No    
Critical care time (excluding procedures): 30-74 minutes (35 minutes)

## 2024-04-11 ENCOUNTER — HOSPITAL ENCOUNTER (OUTPATIENT)
Age: 59
Discharge: HOME | End: 2024-05-02
Attending: STUDENT IN AN ORGANIZED HEALTH CARE EDUCATION/TRAINING PROGRAM
Payer: COMMERCIAL

## 2024-04-12 LAB
ANION GAP SERPL CALC-SCNC: 12 MEQ/L (ref 8–16)
ANION GAP SERPL CALC-SCNC: 18 MEQ/L (ref 8–16)
BASOPHILS ABSOLUTE: 0.1 THOU/MM3 (ref 0–0.1)
BASOPHILS NFR BLD AUTO: 1.2 %
BUN SERPL-MCNC: 40 MG/DL (ref 7–22)
BUN SERPL-MCNC: 45 MG/DL (ref 7–22)
CALCIUM SERPL-MCNC: 9.4 MG/DL (ref 8.5–10.5)
CALCIUM SERPL-MCNC: 9.5 MG/DL (ref 8.5–10.5)
CHLORIDE SERPL-SCNC: 99 MEQ/L (ref 98–111)
CHLORIDE SERPL-SCNC: 99 MEQ/L (ref 98–111)
CO2 SERPL-SCNC: 24 MEQ/L (ref 23–33)
CO2 SERPL-SCNC: 27 MEQ/L (ref 23–33)
CREAT SERPL-MCNC: 1.3 MG/DL (ref 0.4–1.2)
CREAT SERPL-MCNC: 1.3 MG/DL (ref 0.4–1.2)
DEPRECATED RDW RBC AUTO: 51.1 FL (ref 35–45)
EOSINOPHIL NFR BLD AUTO: 3.4 %
EOSINOPHILS ABSOLUTE: 0.4 THOU/MM3 (ref 0–0.4)
ERYTHROCYTE [DISTWIDTH] IN BLOOD BY AUTOMATED COUNT: 14.6 % (ref 11.5–14.5)
GFR SERPL CREATININE-BSD FRML MDRD: 63 ML/MIN/1.73M2
GFR SERPL CREATININE-BSD FRML MDRD: 63 ML/MIN/1.73M2
GLUCOSE SERPL-MCNC: 126 MG/DL (ref 70–108)
GLUCOSE SERPL-MCNC: 152 MG/DL (ref 70–108)
HCT VFR BLD AUTO: 30.2 % (ref 42–52)
HGB BLD-MCNC: 9.3 GM/DL (ref 14–18)
IMM GRANULOCYTES # BLD AUTO: 0.14 THOU/MM3 (ref 0–0.07)
IMM GRANULOCYTES NFR BLD AUTO: 1.3 %
LACTATE SERPL-SCNC: 1 MMOL/L (ref 0.5–2)
LYMPHOCYTES ABSOLUTE: 2.9 THOU/MM3 (ref 1–4.8)
LYMPHOCYTES NFR BLD AUTO: 26.4 %
MCH RBC QN AUTO: 29.6 PG (ref 26–33)
MCHC RBC AUTO-ENTMCNC: 30.8 GM/DL (ref 32.2–35.5)
MCV RBC AUTO: 96.2 FL (ref 80–94)
MONOCYTES ABSOLUTE: 1 THOU/MM3 (ref 0.4–1.3)
MONOCYTES NFR BLD AUTO: 9.1 %
NEUTROPHILS NFR BLD AUTO: 58.6 %
NRBC BLD AUTO-RTO: 0 /100 WBC
OSMOLALITY SERPL: 305 MOSMOL/KG (ref 275–295)
PLATELET # BLD AUTO: 287 THOU/MM3 (ref 130–400)
PMV BLD AUTO: 11.6 FL (ref 9.4–12.4)
POTASSIUM SERPL-SCNC: 4.8 MEQ/L (ref 3.5–5.2)
POTASSIUM SERPL-SCNC: 4.9 MEQ/L (ref 3.5–5.2)
PREALB SERPL-MCNC: 22.6 MG/DL (ref 20–40)
RBC # BLD AUTO: 3.14 MILL/MM3 (ref 4.7–6.1)
SEGMENTED NEUTROPHILS ABSOLUTE COUNT: 6.4 THOU/MM3 (ref 1.8–7.7)
SODIUM SERPL-SCNC: 138 MEQ/L (ref 135–145)
SODIUM SERPL-SCNC: 141 MEQ/L (ref 135–145)
WBC # BLD AUTO: 10.9 THOU/MM3 (ref 4.8–10.8)

## 2024-04-12 PROCEDURE — 99223 1ST HOSP IP/OBS HIGH 75: CPT | Performed by: INTERNAL MEDICINE

## 2024-04-12 NOTE — CONSULTS
Patient:  [facility]    Unit/Bed:RYANN KNDPOOLRM/NONE  MRN: [id number]   PCP: No primary care provider on file.  Date of Admission: 4/11/2024    Assessment and Plan(All pulmonary edema, renal failure, PE, and respiratory failure diagnoses are acute in nature unless otherwise specified):        Acute hypoxic respiratory failure: secondary to impaired airway protection in setting of CVA. Neurological status precludes consideration for decannulation at this point. Will need to achieve further neurologic improvement prior to considering this. Continue working with PT/OT/SLP. Continue TM oxygen as tolerated. He has been weaned off mechanical ventilator for >24 hours at this point. Copious tracheal secretions preclude start of PMV trials. Avoid scopolamine or related secretion-reduction agents given risk for neurologic effects. No fever or leukocytosis, but will send respiratory culture and gram stain, given high risk for HAP with tracheostomy, and recent MRSA pneumonia.   Subacute ischemic stroke / left M1 occlusion: Status post thrombectomy on 3/17/2024 at [facility]. Initial NIHSS was 24 per report. Was not a TNK candidate given prolonged time since last known well. Concern was for cardioembolic source. Echo did not reveal any valvular disease, and bubble study was negative. On telemetry. Course at [facility] c/b cerebral edema, and petechial hemorrhagic conversion at [facility]. Last CT head 3/19 showed stable petechial hemorrhages, with decreasing mass effect and decreasing midline shift. EEG 4/5 showed no epileptiform activity. Remains with flaccid right-sided extremities and aphasia (patient is mute), but he does follow some selective commands and is awake. Noted baseline functional status is impaired given prior stroke in the past.   Trend neurologic exams. PT/OT. SLP. PEG tube for feeds. On aspirin and statin. On lovenox DVT prophylaxis. On telemetry. Primary service following.   Dysphagia: Has PEG for feeds. SLP

## 2024-04-13 LAB
ANION GAP SERPL CALC-SCNC: 15 MEQ/L (ref 8–16)
BASOPHILS ABSOLUTE: 0.1 THOU/MM3 (ref 0–0.1)
BASOPHILS NFR BLD AUTO: 0.8 %
BUN SERPL-MCNC: 47 MG/DL (ref 7–22)
CALCIUM SERPL-MCNC: 9 MG/DL (ref 8.5–10.5)
CHLORIDE SERPL-SCNC: 98 MEQ/L (ref 98–111)
CO2 SERPL-SCNC: 26 MEQ/L (ref 23–33)
CREAT SERPL-MCNC: 1.3 MG/DL (ref 0.4–1.2)
DEPRECATED RDW RBC AUTO: 50.8 FL (ref 35–45)
EOSINOPHIL NFR BLD AUTO: 3.4 %
EOSINOPHILS ABSOLUTE: 0.3 THOU/MM3 (ref 0–0.4)
ERYTHROCYTE [DISTWIDTH] IN BLOOD BY AUTOMATED COUNT: 14.6 % (ref 11.5–14.5)
GFR SERPL CREATININE-BSD FRML MDRD: 63 ML/MIN/1.73M2
GLUCOSE SERPL-MCNC: 154 MG/DL (ref 70–108)
HCT VFR BLD AUTO: 28.4 % (ref 42–52)
HGB BLD-MCNC: 8.7 GM/DL (ref 14–18)
IMM GRANULOCYTES # BLD AUTO: 0.09 THOU/MM3 (ref 0–0.07)
IMM GRANULOCYTES NFR BLD AUTO: 0.9 %
LACTATE SERPL-SCNC: 1 MMOL/L (ref 0.5–2)
LYMPHOCYTES ABSOLUTE: 1.9 THOU/MM3 (ref 1–4.8)
LYMPHOCYTES NFR BLD AUTO: 20.5 %
MCH RBC QN AUTO: 29.3 PG (ref 26–33)
MCHC RBC AUTO-ENTMCNC: 30.6 GM/DL (ref 32.2–35.5)
MCV RBC AUTO: 95.6 FL (ref 80–94)
MONOCYTES ABSOLUTE: 0.9 THOU/MM3 (ref 0.4–1.3)
MONOCYTES NFR BLD AUTO: 9.9 %
NEUTROPHILS NFR BLD AUTO: 64.5 %
NRBC BLD AUTO-RTO: 0 /100 WBC
PLATELET # BLD AUTO: 260 THOU/MM3 (ref 130–400)
PMV BLD AUTO: 11.5 FL (ref 9.4–12.4)
POTASSIUM SERPL-SCNC: 4.6 MEQ/L (ref 3.5–5.2)
RBC # BLD AUTO: 2.97 MILL/MM3 (ref 4.7–6.1)
SEGMENTED NEUTROPHILS ABSOLUTE COUNT: 6.1 THOU/MM3 (ref 1.8–7.7)
SODIUM SERPL-SCNC: 139 MEQ/L (ref 135–145)
WBC # BLD AUTO: 9.5 THOU/MM3 (ref 4.8–10.8)

## 2024-04-14 ENCOUNTER — APPOINTMENT (OUTPATIENT)
Dept: GENERAL RADIOLOGY | Age: 59
End: 2024-04-14
Attending: STUDENT IN AN ORGANIZED HEALTH CARE EDUCATION/TRAINING PROGRAM
Payer: COMMERCIAL

## 2024-04-14 LAB
ANION GAP SERPL CALC-SCNC: 12 MEQ/L (ref 8–16)
BASOPHILS ABSOLUTE: 0.1 THOU/MM3 (ref 0–0.1)
BASOPHILS NFR BLD AUTO: 1 %
BUN SERPL-MCNC: 46 MG/DL (ref 7–22)
CALCIUM SERPL-MCNC: 9.2 MG/DL (ref 8.5–10.5)
CHLORIDE SERPL-SCNC: 99 MEQ/L (ref 98–111)
CO2 SERPL-SCNC: 26 MEQ/L (ref 23–33)
CREAT SERPL-MCNC: 1.3 MG/DL (ref 0.4–1.2)
DEPRECATED RDW RBC AUTO: 50.2 FL (ref 35–45)
EOSINOPHIL NFR BLD AUTO: 3.1 %
EOSINOPHILS ABSOLUTE: 0.3 THOU/MM3 (ref 0–0.4)
ERYTHROCYTE [DISTWIDTH] IN BLOOD BY AUTOMATED COUNT: 14.6 % (ref 11.5–14.5)
GFR SERPL CREATININE-BSD FRML MDRD: 63 ML/MIN/1.73M2
GLUCOSE SERPL-MCNC: 159 MG/DL (ref 70–108)
HCT VFR BLD AUTO: 28.3 % (ref 42–52)
HGB BLD-MCNC: 8.7 GM/DL (ref 14–18)
IMM GRANULOCYTES # BLD AUTO: 0.1 THOU/MM3 (ref 0–0.07)
IMM GRANULOCYTES NFR BLD AUTO: 1.1 %
LYMPHOCYTES ABSOLUTE: 2.3 THOU/MM3 (ref 1–4.8)
LYMPHOCYTES NFR BLD AUTO: 25.1 %
MCH RBC QN AUTO: 29.2 PG (ref 26–33)
MCHC RBC AUTO-ENTMCNC: 30.7 GM/DL (ref 32.2–35.5)
MCV RBC AUTO: 95 FL (ref 80–94)
MONOCYTES ABSOLUTE: 0.9 THOU/MM3 (ref 0.4–1.3)
MONOCYTES NFR BLD AUTO: 10.5 %
MRSA DNA SPEC QL NAA+PROBE: POSITIVE
NEUTROPHILS NFR BLD AUTO: 59.2 %
NRBC BLD AUTO-RTO: 0 /100 WBC
PLATELET # BLD AUTO: 253 THOU/MM3 (ref 130–400)
PMV BLD AUTO: 11.7 FL (ref 9.4–12.4)
POTASSIUM SERPL-SCNC: 4.4 MEQ/L (ref 3.5–5.2)
RBC # BLD AUTO: 2.98 MILL/MM3 (ref 4.7–6.1)
SEGMENTED NEUTROPHILS ABSOLUTE COUNT: 5.3 THOU/MM3 (ref 1.8–7.7)
SODIUM SERPL-SCNC: 137 MEQ/L (ref 135–145)
WBC # BLD AUTO: 9 THOU/MM3 (ref 4.8–10.8)

## 2024-04-14 PROCEDURE — 71045 X-RAY EXAM CHEST 1 VIEW: CPT

## 2024-04-15 LAB
ANION GAP SERPL CALC-SCNC: 13 MEQ/L (ref 8–16)
BACTERIA SPEC RESP CULT: ABNORMAL
BACTERIA SPEC RESP CULT: ABNORMAL
BASOPHILS ABSOLUTE: 0.1 THOU/MM3 (ref 0–0.1)
BASOPHILS NFR BLD AUTO: 0.9 %
BUN SERPL-MCNC: 45 MG/DL (ref 7–22)
CALCIUM SERPL-MCNC: 9.2 MG/DL (ref 8.5–10.5)
CHLORIDE SERPL-SCNC: 101 MEQ/L (ref 98–111)
CO2 SERPL-SCNC: 27 MEQ/L (ref 23–33)
CREAT SERPL-MCNC: 1.2 MG/DL (ref 0.4–1.2)
DEPRECATED RDW RBC AUTO: 50.5 FL (ref 35–45)
EOSINOPHIL NFR BLD AUTO: 3 %
EOSINOPHILS ABSOLUTE: 0.3 THOU/MM3 (ref 0–0.4)
ERYTHROCYTE [DISTWIDTH] IN BLOOD BY AUTOMATED COUNT: 14.6 % (ref 11.5–14.5)
GFR SERPL CREATININE-BSD FRML MDRD: 69 ML/MIN/1.73M2
GLUCOSE SERPL-MCNC: 141 MG/DL (ref 70–108)
GRAM STN SPEC: ABNORMAL
HCT VFR BLD AUTO: 30.2 % (ref 42–52)
HGB BLD-MCNC: 9.3 GM/DL (ref 14–18)
IMM GRANULOCYTES # BLD AUTO: 0.06 THOU/MM3 (ref 0–0.07)
IMM GRANULOCYTES NFR BLD AUTO: 0.6 %
LYMPHOCYTES ABSOLUTE: 2.3 THOU/MM3 (ref 1–4.8)
LYMPHOCYTES NFR BLD AUTO: 24.8 %
MCH RBC QN AUTO: 29.4 PG (ref 26–33)
MCHC RBC AUTO-ENTMCNC: 30.8 GM/DL (ref 32.2–35.5)
MCV RBC AUTO: 95.6 FL (ref 80–94)
MONOCYTES ABSOLUTE: 0.9 THOU/MM3 (ref 0.4–1.3)
MONOCYTES NFR BLD AUTO: 9.9 %
NEUTROPHILS NFR BLD AUTO: 60.8 %
NRBC BLD AUTO-RTO: 0 /100 WBC
ORGANISM: ABNORMAL
ORGANISM: ABNORMAL
PLATELET # BLD AUTO: 242 THOU/MM3 (ref 130–400)
PMV BLD AUTO: 11.7 FL (ref 9.4–12.4)
POTASSIUM SERPL-SCNC: 4.5 MEQ/L (ref 3.5–5.2)
RBC # BLD AUTO: 3.16 MILL/MM3 (ref 4.7–6.1)
SEGMENTED NEUTROPHILS ABSOLUTE COUNT: 5.7 THOU/MM3 (ref 1.8–7.7)
SODIUM SERPL-SCNC: 141 MEQ/L (ref 135–145)
WBC # BLD AUTO: 9.3 THOU/MM3 (ref 4.8–10.8)

## 2024-04-15 PROCEDURE — 99291 CRITICAL CARE FIRST HOUR: CPT | Performed by: INTERNAL MEDICINE

## 2024-04-16 LAB
ANION GAP SERPL CALC-SCNC: 12 MEQ/L (ref 8–16)
BASOPHILS ABSOLUTE: 0.1 THOU/MM3 (ref 0–0.1)
BASOPHILS NFR BLD AUTO: 0.9 %
BUN SERPL-MCNC: 43 MG/DL (ref 7–22)
CALCIUM SERPL-MCNC: 9.4 MG/DL (ref 8.5–10.5)
CHLORIDE SERPL-SCNC: 101 MEQ/L (ref 98–111)
CO2 SERPL-SCNC: 26 MEQ/L (ref 23–33)
CREAT SERPL-MCNC: 1.2 MG/DL (ref 0.4–1.2)
DEPRECATED RDW RBC AUTO: 51.4 FL (ref 35–45)
EOSINOPHIL NFR BLD AUTO: 2.2 %
EOSINOPHILS ABSOLUTE: 0.2 THOU/MM3 (ref 0–0.4)
ERYTHROCYTE [DISTWIDTH] IN BLOOD BY AUTOMATED COUNT: 14.6 % (ref 11.5–14.5)
GFR SERPL CREATININE-BSD FRML MDRD: 69 ML/MIN/1.73M2
GLUCOSE SERPL-MCNC: 168 MG/DL (ref 70–108)
HCT VFR BLD AUTO: 30.1 % (ref 42–52)
HGB BLD-MCNC: 9.2 GM/DL (ref 14–18)
IMM GRANULOCYTES # BLD AUTO: 0.06 THOU/MM3 (ref 0–0.07)
IMM GRANULOCYTES NFR BLD AUTO: 0.6 %
LYMPHOCYTES ABSOLUTE: 2.1 THOU/MM3 (ref 1–4.8)
LYMPHOCYTES NFR BLD AUTO: 21.4 %
MCH RBC QN AUTO: 29.1 PG (ref 26–33)
MCHC RBC AUTO-ENTMCNC: 30.6 GM/DL (ref 32.2–35.5)
MCV RBC AUTO: 95.3 FL (ref 80–94)
MONOCYTES ABSOLUTE: 0.9 THOU/MM3 (ref 0.4–1.3)
MONOCYTES NFR BLD AUTO: 9.3 %
NEUTROPHILS NFR BLD AUTO: 65.6 %
NRBC BLD AUTO-RTO: 0 /100 WBC
PLATELET # BLD AUTO: 240 THOU/MM3 (ref 130–400)
PMV BLD AUTO: 11.5 FL (ref 9.4–12.4)
POTASSIUM SERPL-SCNC: 4.6 MEQ/L (ref 3.5–5.2)
RBC # BLD AUTO: 3.16 MILL/MM3 (ref 4.7–6.1)
SEGMENTED NEUTROPHILS ABSOLUTE COUNT: 6.6 THOU/MM3 (ref 1.8–7.7)
SODIUM SERPL-SCNC: 139 MEQ/L (ref 135–145)
WBC # BLD AUTO: 10 THOU/MM3 (ref 4.8–10.8)

## 2024-04-16 PROCEDURE — 99232 SBSQ HOSP IP/OBS MODERATE 35: CPT | Performed by: INTERNAL MEDICINE

## 2024-04-17 PROBLEM — J96.01 ACUTE RESPIRATORY FAILURE WITH HYPOXIA (HCC): Status: ACTIVE | Noted: 2024-04-17

## 2024-04-17 LAB
ANION GAP SERPL CALC-SCNC: 14 MEQ/L (ref 8–16)
BASOPHILS ABSOLUTE: 0.1 THOU/MM3 (ref 0–0.1)
BASOPHILS NFR BLD AUTO: 1.1 %
BUN SERPL-MCNC: 43 MG/DL (ref 7–22)
CALCIUM SERPL-MCNC: 9.4 MG/DL (ref 8.5–10.5)
CHLORIDE SERPL-SCNC: 101 MEQ/L (ref 98–111)
CO2 SERPL-SCNC: 26 MEQ/L (ref 23–33)
CREAT SERPL-MCNC: 1.2 MG/DL (ref 0.4–1.2)
DEPRECATED RDW RBC AUTO: 51.8 FL (ref 35–45)
EOSINOPHIL NFR BLD AUTO: 3.3 %
EOSINOPHILS ABSOLUTE: 0.3 THOU/MM3 (ref 0–0.4)
ERYTHROCYTE [DISTWIDTH] IN BLOOD BY AUTOMATED COUNT: 14.7 % (ref 11.5–14.5)
GFR SERPL CREATININE-BSD FRML MDRD: 69 ML/MIN/1.73M2
GLUCOSE SERPL-MCNC: 171 MG/DL (ref 70–108)
HCT VFR BLD AUTO: 30.7 % (ref 42–52)
HGB BLD-MCNC: 9.4 GM/DL (ref 14–18)
IMM GRANULOCYTES # BLD AUTO: 0.06 THOU/MM3 (ref 0–0.07)
IMM GRANULOCYTES NFR BLD AUTO: 0.7 %
LYMPHOCYTES ABSOLUTE: 2.3 THOU/MM3 (ref 1–4.8)
LYMPHOCYTES NFR BLD AUTO: 25.5 %
MCH RBC QN AUTO: 29.7 PG (ref 26–33)
MCHC RBC AUTO-ENTMCNC: 30.6 GM/DL (ref 32.2–35.5)
MCV RBC AUTO: 96.8 FL (ref 80–94)
MONOCYTES ABSOLUTE: 0.7 THOU/MM3 (ref 0.4–1.3)
MONOCYTES NFR BLD AUTO: 8 %
NEUTROPHILS NFR BLD AUTO: 61.4 %
NRBC BLD AUTO-RTO: 0 /100 WBC
PLATELET # BLD AUTO: 230 THOU/MM3 (ref 130–400)
PMV BLD AUTO: 11.4 FL (ref 9.4–12.4)
POTASSIUM SERPL-SCNC: 4.4 MEQ/L (ref 3.5–5.2)
RBC # BLD AUTO: 3.17 MILL/MM3 (ref 4.7–6.1)
SEGMENTED NEUTROPHILS ABSOLUTE COUNT: 5.5 THOU/MM3 (ref 1.8–7.7)
SODIUM SERPL-SCNC: 141 MEQ/L (ref 135–145)
WBC # BLD AUTO: 9 THOU/MM3 (ref 4.8–10.8)

## 2024-04-17 PROCEDURE — 99232 SBSQ HOSP IP/OBS MODERATE 35: CPT | Performed by: NURSE PRACTITIONER

## 2024-04-18 LAB
ANION GAP SERPL CALC-SCNC: 15 MEQ/L (ref 8–16)
BASOPHILS ABSOLUTE: 0.1 THOU/MM3 (ref 0–0.1)
BASOPHILS NFR BLD AUTO: 0.9 %
BUN SERPL-MCNC: 43 MG/DL (ref 7–22)
CALCIUM SERPL-MCNC: 9.5 MG/DL (ref 8.5–10.5)
CHLORIDE SERPL-SCNC: 103 MEQ/L (ref 98–111)
CO2 SERPL-SCNC: 25 MEQ/L (ref 23–33)
CREAT SERPL-MCNC: 1.2 MG/DL (ref 0.4–1.2)
DEPRECATED RDW RBC AUTO: 52.1 FL (ref 35–45)
EOSINOPHIL NFR BLD AUTO: 3.2 %
EOSINOPHILS ABSOLUTE: 0.3 THOU/MM3 (ref 0–0.4)
ERYTHROCYTE [DISTWIDTH] IN BLOOD BY AUTOMATED COUNT: 14.8 % (ref 11.5–14.5)
GFR SERPL CREATININE-BSD FRML MDRD: 69 ML/MIN/1.73M2
GLUCOSE SERPL-MCNC: 166 MG/DL (ref 70–108)
HCT VFR BLD AUTO: 30.8 % (ref 42–52)
HGB BLD-MCNC: 9.4 GM/DL (ref 14–18)
IMM GRANULOCYTES # BLD AUTO: 0.06 THOU/MM3 (ref 0–0.07)
IMM GRANULOCYTES NFR BLD AUTO: 0.6 %
LYMPHOCYTES ABSOLUTE: 2.3 THOU/MM3 (ref 1–4.8)
LYMPHOCYTES NFR BLD AUTO: 24.8 %
MCH RBC QN AUTO: 29.1 PG (ref 26–33)
MCHC RBC AUTO-ENTMCNC: 30.5 GM/DL (ref 32.2–35.5)
MCV RBC AUTO: 95.4 FL (ref 80–94)
MONOCYTES ABSOLUTE: 0.8 THOU/MM3 (ref 0.4–1.3)
MONOCYTES NFR BLD AUTO: 8.5 %
NEUTROPHILS NFR BLD AUTO: 62 %
NRBC BLD AUTO-RTO: 0 /100 WBC
PLATELET # BLD AUTO: 240 THOU/MM3 (ref 130–400)
PMV BLD AUTO: 11.4 FL (ref 9.4–12.4)
POTASSIUM SERPL-SCNC: 4.4 MEQ/L (ref 3.5–5.2)
RBC # BLD AUTO: 3.23 MILL/MM3 (ref 4.7–6.1)
SEGMENTED NEUTROPHILS ABSOLUTE COUNT: 5.8 THOU/MM3 (ref 1.8–7.7)
SODIUM SERPL-SCNC: 143 MEQ/L (ref 135–145)
WBC # BLD AUTO: 9.3 THOU/MM3 (ref 4.8–10.8)

## 2024-04-18 PROCEDURE — 99232 SBSQ HOSP IP/OBS MODERATE 35: CPT | Performed by: NURSE PRACTITIONER

## 2024-04-19 ENCOUNTER — APPOINTMENT (OUTPATIENT)
Dept: GENERAL RADIOLOGY | Age: 59
End: 2024-04-19
Attending: STUDENT IN AN ORGANIZED HEALTH CARE EDUCATION/TRAINING PROGRAM
Payer: COMMERCIAL

## 2024-04-19 LAB
ANION GAP SERPL CALC-SCNC: 15 MEQ/L (ref 8–16)
BASOPHILS ABSOLUTE: 0.1 THOU/MM3 (ref 0–0.1)
BASOPHILS NFR BLD AUTO: 0.9 %
BUN SERPL-MCNC: 43 MG/DL (ref 7–22)
CALCIUM SERPL-MCNC: 9.2 MG/DL (ref 8.5–10.5)
CHLORIDE SERPL-SCNC: 101 MEQ/L (ref 98–111)
CO2 SERPL-SCNC: 25 MEQ/L (ref 23–33)
CREAT SERPL-MCNC: 1.1 MG/DL (ref 0.4–1.2)
CRP SERPL-MCNC: 2.09 MG/DL (ref 0–1)
DEPRECATED RDW RBC AUTO: 52.8 FL (ref 35–45)
EOSINOPHIL NFR BLD AUTO: 3.1 %
EOSINOPHILS ABSOLUTE: 0.3 THOU/MM3 (ref 0–0.4)
ERYTHROCYTE [DISTWIDTH] IN BLOOD BY AUTOMATED COUNT: 14.9 % (ref 11.5–14.5)
GFR SERPL CREATININE-BSD FRML MDRD: 77 ML/MIN/1.73M2
GLUCOSE SERPL-MCNC: 152 MG/DL (ref 70–108)
HCT VFR BLD AUTO: 30.5 % (ref 42–52)
HGB BLD-MCNC: 9.2 GM/DL (ref 14–18)
IMM GRANULOCYTES # BLD AUTO: 0.04 THOU/MM3 (ref 0–0.07)
IMM GRANULOCYTES NFR BLD AUTO: 0.4 %
LYMPHOCYTES ABSOLUTE: 2.3 THOU/MM3 (ref 1–4.8)
LYMPHOCYTES NFR BLD AUTO: 23.6 %
MCH RBC QN AUTO: 29.1 PG (ref 26–33)
MCHC RBC AUTO-ENTMCNC: 30.2 GM/DL (ref 32.2–35.5)
MCV RBC AUTO: 96.5 FL (ref 80–94)
MONOCYTES ABSOLUTE: 0.8 THOU/MM3 (ref 0.4–1.3)
MONOCYTES NFR BLD AUTO: 8.1 %
NEUTROPHILS NFR BLD AUTO: 63.9 %
NRBC BLD AUTO-RTO: 0 /100 WBC
PLATELET # BLD AUTO: 226 THOU/MM3 (ref 130–400)
PMV BLD AUTO: 11.4 FL (ref 9.4–12.4)
POTASSIUM SERPL-SCNC: 4.2 MEQ/L (ref 3.5–5.2)
PROCALCITONIN SERPL IA-MCNC: 0.1 NG/ML (ref 0.01–0.09)
RBC # BLD AUTO: 3.16 MILL/MM3 (ref 4.7–6.1)
SEGMENTED NEUTROPHILS ABSOLUTE COUNT: 6.1 THOU/MM3 (ref 1.8–7.7)
SODIUM SERPL-SCNC: 141 MEQ/L (ref 135–145)
WBC # BLD AUTO: 9.6 THOU/MM3 (ref 4.8–10.8)

## 2024-04-19 PROCEDURE — 99232 SBSQ HOSP IP/OBS MODERATE 35: CPT | Performed by: NURSE PRACTITIONER

## 2024-04-19 PROCEDURE — 71045 X-RAY EXAM CHEST 1 VIEW: CPT

## 2024-04-20 LAB
ANION GAP SERPL CALC-SCNC: 13 MEQ/L (ref 8–16)
BASOPHILS ABSOLUTE: 0.1 THOU/MM3 (ref 0–0.1)
BASOPHILS NFR BLD AUTO: 0.7 %
BUN SERPL-MCNC: 42 MG/DL (ref 7–22)
CALCIUM SERPL-MCNC: 9.5 MG/DL (ref 8.5–10.5)
CHLORIDE SERPL-SCNC: 101 MEQ/L (ref 98–111)
CO2 SERPL-SCNC: 28 MEQ/L (ref 23–33)
CREAT SERPL-MCNC: 1.2 MG/DL (ref 0.4–1.2)
DEPRECATED RDW RBC AUTO: 51.4 FL (ref 35–45)
EOSINOPHIL NFR BLD AUTO: 3.1 %
EOSINOPHILS ABSOLUTE: 0.3 THOU/MM3 (ref 0–0.4)
ERYTHROCYTE [DISTWIDTH] IN BLOOD BY AUTOMATED COUNT: 14.8 % (ref 11.5–14.5)
GFR SERPL CREATININE-BSD FRML MDRD: 69 ML/MIN/1.73M2
GLUCOSE SERPL-MCNC: 160 MG/DL (ref 70–108)
HCT VFR BLD AUTO: 32.1 % (ref 42–52)
HGB BLD-MCNC: 9.7 GM/DL (ref 14–18)
IMM GRANULOCYTES # BLD AUTO: 0.05 THOU/MM3 (ref 0–0.07)
IMM GRANULOCYTES NFR BLD AUTO: 0.5 %
LYMPHOCYTES ABSOLUTE: 1.9 THOU/MM3 (ref 1–4.8)
LYMPHOCYTES NFR BLD AUTO: 20.3 %
MCH RBC QN AUTO: 29 PG (ref 26–33)
MCHC RBC AUTO-ENTMCNC: 30.2 GM/DL (ref 32.2–35.5)
MCV RBC AUTO: 96.1 FL (ref 80–94)
MONOCYTES ABSOLUTE: 0.8 THOU/MM3 (ref 0.4–1.3)
MONOCYTES NFR BLD AUTO: 8.1 %
NEUTROPHILS NFR BLD AUTO: 67.3 %
NRBC BLD AUTO-RTO: 0 /100 WBC
PLATELET # BLD AUTO: 226 THOU/MM3 (ref 130–400)
PMV BLD AUTO: 11.1 FL (ref 9.4–12.4)
POTASSIUM SERPL-SCNC: 4.4 MEQ/L (ref 3.5–5.2)
RBC # BLD AUTO: 3.34 MILL/MM3 (ref 4.7–6.1)
SEGMENTED NEUTROPHILS ABSOLUTE COUNT: 6.3 THOU/MM3 (ref 1.8–7.7)
SODIUM SERPL-SCNC: 142 MEQ/L (ref 135–145)
WBC # BLD AUTO: 9.4 THOU/MM3 (ref 4.8–10.8)

## 2024-04-21 LAB
ANION GAP SERPL CALC-SCNC: 13 MEQ/L (ref 8–16)
BASOPHILS ABSOLUTE: 0.1 THOU/MM3 (ref 0–0.1)
BASOPHILS NFR BLD AUTO: 0.9 %
BUN SERPL-MCNC: 41 MG/DL (ref 7–22)
CALCIUM SERPL-MCNC: 9.2 MG/DL (ref 8.5–10.5)
CHLORIDE SERPL-SCNC: 101 MEQ/L (ref 98–111)
CO2 SERPL-SCNC: 26 MEQ/L (ref 23–33)
CREAT SERPL-MCNC: 1.1 MG/DL (ref 0.4–1.2)
DEPRECATED RDW RBC AUTO: 52.9 FL (ref 35–45)
EOSINOPHIL NFR BLD AUTO: 3 %
EOSINOPHILS ABSOLUTE: 0.3 THOU/MM3 (ref 0–0.4)
ERYTHROCYTE [DISTWIDTH] IN BLOOD BY AUTOMATED COUNT: 15 % (ref 11.5–14.5)
GFR SERPL CREATININE-BSD FRML MDRD: 77 ML/MIN/1.73M2
GLUCOSE SERPL-MCNC: 150 MG/DL (ref 70–108)
HCT VFR BLD AUTO: 32.5 % (ref 42–52)
HGB BLD-MCNC: 9.7 GM/DL (ref 14–18)
IMM GRANULOCYTES # BLD AUTO: 0.06 THOU/MM3 (ref 0–0.07)
IMM GRANULOCYTES NFR BLD AUTO: 0.7 %
LYMPHOCYTES ABSOLUTE: 1.9 THOU/MM3 (ref 1–4.8)
LYMPHOCYTES NFR BLD AUTO: 21.8 %
MCH RBC QN AUTO: 29 PG (ref 26–33)
MCHC RBC AUTO-ENTMCNC: 29.8 GM/DL (ref 32.2–35.5)
MCV RBC AUTO: 97 FL (ref 80–94)
MONOCYTES ABSOLUTE: 0.7 THOU/MM3 (ref 0.4–1.3)
MONOCYTES NFR BLD AUTO: 8.3 %
NEUTROPHILS NFR BLD AUTO: 65.3 %
NRBC BLD AUTO-RTO: 0 /100 WBC
PLATELET # BLD AUTO: 227 THOU/MM3 (ref 130–400)
PMV BLD AUTO: 11.3 FL (ref 9.4–12.4)
POTASSIUM SERPL-SCNC: 4.3 MEQ/L (ref 3.5–5.2)
RBC # BLD AUTO: 3.35 MILL/MM3 (ref 4.7–6.1)
SEGMENTED NEUTROPHILS ABSOLUTE COUNT: 5.7 THOU/MM3 (ref 1.8–7.7)
SODIUM SERPL-SCNC: 140 MEQ/L (ref 135–145)
WBC # BLD AUTO: 8.8 THOU/MM3 (ref 4.8–10.8)

## 2024-04-22 ENCOUNTER — APPOINTMENT (OUTPATIENT)
Dept: GENERAL RADIOLOGY | Age: 59
End: 2024-04-22
Attending: STUDENT IN AN ORGANIZED HEALTH CARE EDUCATION/TRAINING PROGRAM
Payer: COMMERCIAL

## 2024-04-22 LAB
ANION GAP SERPL CALC-SCNC: 10 MEQ/L (ref 8–16)
BASOPHILS ABSOLUTE: 0.1 THOU/MM3 (ref 0–0.1)
BASOPHILS NFR BLD AUTO: 0.8 %
BUN SERPL-MCNC: 39 MG/DL (ref 7–22)
CALCIUM SERPL-MCNC: 9.4 MG/DL (ref 8.5–10.5)
CHLORIDE SERPL-SCNC: 101 MEQ/L (ref 98–111)
CO2 SERPL-SCNC: 29 MEQ/L (ref 23–33)
CREAT SERPL-MCNC: 1.1 MG/DL (ref 0.4–1.2)
DEPRECATED RDW RBC AUTO: 52.3 FL (ref 35–45)
EOSINOPHIL NFR BLD AUTO: 3.1 %
EOSINOPHILS ABSOLUTE: 0.3 THOU/MM3 (ref 0–0.4)
ERYTHROCYTE [DISTWIDTH] IN BLOOD BY AUTOMATED COUNT: 14.9 % (ref 11.5–14.5)
GFR SERPL CREATININE-BSD FRML MDRD: 77 ML/MIN/1.73M2
GLUCOSE SERPL-MCNC: 157 MG/DL (ref 70–108)
HCT VFR BLD AUTO: 31.8 % (ref 42–52)
HGB BLD-MCNC: 9.8 GM/DL (ref 14–18)
IMM GRANULOCYTES # BLD AUTO: 0.05 THOU/MM3 (ref 0–0.07)
IMM GRANULOCYTES NFR BLD AUTO: 0.5 %
LYMPHOCYTES ABSOLUTE: 2.4 THOU/MM3 (ref 1–4.8)
LYMPHOCYTES NFR BLD AUTO: 22.4 %
MCH RBC QN AUTO: 29.3 PG (ref 26–33)
MCHC RBC AUTO-ENTMCNC: 30.8 GM/DL (ref 32.2–35.5)
MCV RBC AUTO: 95.2 FL (ref 80–94)
MONOCYTES ABSOLUTE: 0.9 THOU/MM3 (ref 0.4–1.3)
MONOCYTES NFR BLD AUTO: 8.2 %
NEUTROPHILS NFR BLD AUTO: 65 %
NRBC BLD AUTO-RTO: 0 /100 WBC
PLATELET # BLD AUTO: 235 THOU/MM3 (ref 130–400)
PMV BLD AUTO: 11.7 FL (ref 9.4–12.4)
POTASSIUM SERPL-SCNC: 4.5 MEQ/L (ref 3.5–5.2)
RBC # BLD AUTO: 3.34 MILL/MM3 (ref 4.7–6.1)
SEGMENTED NEUTROPHILS ABSOLUTE COUNT: 6.8 THOU/MM3 (ref 1.8–7.7)
SODIUM SERPL-SCNC: 140 MEQ/L (ref 135–145)
WBC # BLD AUTO: 10.5 THOU/MM3 (ref 4.8–10.8)

## 2024-04-22 PROCEDURE — 71045 X-RAY EXAM CHEST 1 VIEW: CPT

## 2024-04-23 LAB
ANION GAP SERPL CALC-SCNC: 10 MEQ/L (ref 8–16)
BASOPHILS ABSOLUTE: 0.1 THOU/MM3 (ref 0–0.1)
BASOPHILS NFR BLD AUTO: 0.9 %
BUN SERPL-MCNC: 36 MG/DL (ref 7–22)
CALCIUM SERPL-MCNC: 9.2 MG/DL (ref 8.5–10.5)
CHLORIDE SERPL-SCNC: 102 MEQ/L (ref 98–111)
CO2 SERPL-SCNC: 28 MEQ/L (ref 23–33)
CREAT SERPL-MCNC: 1 MG/DL (ref 0.4–1.2)
DEPRECATED RDW RBC AUTO: 52.4 FL (ref 35–45)
EOSINOPHIL NFR BLD AUTO: 2.8 %
EOSINOPHILS ABSOLUTE: 0.3 THOU/MM3 (ref 0–0.4)
ERYTHROCYTE [DISTWIDTH] IN BLOOD BY AUTOMATED COUNT: 15 % (ref 11.5–14.5)
GFR SERPL CREATININE-BSD FRML MDRD: 86 ML/MIN/1.73M2
GLUCOSE SERPL-MCNC: 121 MG/DL (ref 70–108)
HCT VFR BLD AUTO: 32 % (ref 42–52)
HGB BLD-MCNC: 9.8 GM/DL (ref 14–18)
IMM GRANULOCYTES # BLD AUTO: 0.05 THOU/MM3 (ref 0–0.07)
IMM GRANULOCYTES NFR BLD AUTO: 0.5 %
LYMPHOCYTES ABSOLUTE: 2.2 THOU/MM3 (ref 1–4.8)
LYMPHOCYTES NFR BLD AUTO: 20.7 %
MCH RBC QN AUTO: 29.5 PG (ref 26–33)
MCHC RBC AUTO-ENTMCNC: 30.6 GM/DL (ref 32.2–35.5)
MCV RBC AUTO: 96.4 FL (ref 80–94)
MONOCYTES ABSOLUTE: 0.9 THOU/MM3 (ref 0.4–1.3)
MONOCYTES NFR BLD AUTO: 8.5 %
NEUTROPHILS NFR BLD AUTO: 66.6 %
NRBC BLD AUTO-RTO: 0 /100 WBC
PLATELET # BLD AUTO: 209 THOU/MM3 (ref 130–400)
PMV BLD AUTO: 11.2 FL (ref 9.4–12.4)
POTASSIUM SERPL-SCNC: 4.3 MEQ/L (ref 3.5–5.2)
RBC # BLD AUTO: 3.32 MILL/MM3 (ref 4.7–6.1)
SEGMENTED NEUTROPHILS ABSOLUTE COUNT: 7.2 THOU/MM3 (ref 1.8–7.7)
SODIUM SERPL-SCNC: 140 MEQ/L (ref 135–145)
WBC # BLD AUTO: 10.8 THOU/MM3 (ref 4.8–10.8)

## 2024-04-23 PROCEDURE — 99232 SBSQ HOSP IP/OBS MODERATE 35: CPT | Performed by: NURSE PRACTITIONER

## 2024-04-24 LAB
ANION GAP SERPL CALC-SCNC: 13 MEQ/L (ref 8–16)
BASOPHILS ABSOLUTE: 0.1 THOU/MM3 (ref 0–0.1)
BASOPHILS NFR BLD AUTO: 0.6 %
BUN SERPL-MCNC: 33 MG/DL (ref 7–22)
CALCIUM SERPL-MCNC: 9.5 MG/DL (ref 8.5–10.5)
CHLORIDE SERPL-SCNC: 100 MEQ/L (ref 98–111)
CO2 SERPL-SCNC: 26 MEQ/L (ref 23–33)
CREAT SERPL-MCNC: 1 MG/DL (ref 0.4–1.2)
DEPRECATED RDW RBC AUTO: 52.1 FL (ref 35–45)
EOSINOPHIL NFR BLD AUTO: 2 %
EOSINOPHILS ABSOLUTE: 0.2 THOU/MM3 (ref 0–0.4)
ERYTHROCYTE [DISTWIDTH] IN BLOOD BY AUTOMATED COUNT: 15 % (ref 11.5–14.5)
GFR SERPL CREATININE-BSD FRML MDRD: 86 ML/MIN/1.73M2
GLUCOSE SERPL-MCNC: 145 MG/DL (ref 70–108)
HCT VFR BLD AUTO: 32 % (ref 42–52)
HGB BLD-MCNC: 9.8 GM/DL (ref 14–18)
IMM GRANULOCYTES # BLD AUTO: 0.05 THOU/MM3 (ref 0–0.07)
IMM GRANULOCYTES NFR BLD AUTO: 0.5 %
LYMPHOCYTES ABSOLUTE: 2 THOU/MM3 (ref 1–4.8)
LYMPHOCYTES NFR BLD AUTO: 18.4 %
MCH RBC QN AUTO: 29.1 PG (ref 26–33)
MCHC RBC AUTO-ENTMCNC: 30.6 GM/DL (ref 32.2–35.5)
MCV RBC AUTO: 95 FL (ref 80–94)
MONOCYTES ABSOLUTE: 0.9 THOU/MM3 (ref 0.4–1.3)
MONOCYTES NFR BLD AUTO: 8.5 %
NEUTROPHILS NFR BLD AUTO: 70 %
NRBC BLD AUTO-RTO: 0 /100 WBC
PLATELET # BLD AUTO: 218 THOU/MM3 (ref 130–400)
PMV BLD AUTO: 11.5 FL (ref 9.4–12.4)
POTASSIUM SERPL-SCNC: 4.4 MEQ/L (ref 3.5–5.2)
RBC # BLD AUTO: 3.37 MILL/MM3 (ref 4.7–6.1)
SEGMENTED NEUTROPHILS ABSOLUTE COUNT: 7.8 THOU/MM3 (ref 1.8–7.7)
SODIUM SERPL-SCNC: 139 MEQ/L (ref 135–145)
WBC # BLD AUTO: 11.1 THOU/MM3 (ref 4.8–10.8)

## 2024-04-25 LAB
ANION GAP SERPL CALC-SCNC: 11 MEQ/L (ref 8–16)
BASOPHILS ABSOLUTE: 0.1 THOU/MM3 (ref 0–0.1)
BASOPHILS NFR BLD AUTO: 0.6 %
BUN SERPL-MCNC: 32 MG/DL (ref 7–22)
CALCIUM SERPL-MCNC: 9.4 MG/DL (ref 8.5–10.5)
CHLORIDE SERPL-SCNC: 98 MEQ/L (ref 98–111)
CO2 SERPL-SCNC: 28 MEQ/L (ref 23–33)
CREAT SERPL-MCNC: 1 MG/DL (ref 0.4–1.2)
DEPRECATED RDW RBC AUTO: 52.3 FL (ref 35–45)
EOSINOPHIL NFR BLD AUTO: 1.9 %
EOSINOPHILS ABSOLUTE: 0.2 THOU/MM3 (ref 0–0.4)
ERYTHROCYTE [DISTWIDTH] IN BLOOD BY AUTOMATED COUNT: 15 % (ref 11.5–14.5)
GFR SERPL CREATININE-BSD FRML MDRD: 86 ML/MIN/1.73M2
GLUCOSE SERPL-MCNC: 156 MG/DL (ref 70–108)
HCT VFR BLD AUTO: 31.1 % (ref 42–52)
HGB BLD-MCNC: 9.5 GM/DL (ref 14–18)
IMM GRANULOCYTES # BLD AUTO: 0.04 THOU/MM3 (ref 0–0.07)
IMM GRANULOCYTES NFR BLD AUTO: 0.4 %
LYMPHOCYTES ABSOLUTE: 2 THOU/MM3 (ref 1–4.8)
LYMPHOCYTES NFR BLD AUTO: 19.7 %
MCH RBC QN AUTO: 29.2 PG (ref 26–33)
MCHC RBC AUTO-ENTMCNC: 30.5 GM/DL (ref 32.2–35.5)
MCV RBC AUTO: 95.7 FL (ref 80–94)
MONOCYTES ABSOLUTE: 0.9 THOU/MM3 (ref 0.4–1.3)
MONOCYTES NFR BLD AUTO: 8.8 %
NEUTROPHILS NFR BLD AUTO: 68.6 %
NRBC BLD AUTO-RTO: 0 /100 WBC
PLATELET # BLD AUTO: 209 THOU/MM3 (ref 130–400)
PMV BLD AUTO: 11.6 FL (ref 9.4–12.4)
POTASSIUM SERPL-SCNC: 4.2 MEQ/L (ref 3.5–5.2)
RBC # BLD AUTO: 3.25 MILL/MM3 (ref 4.7–6.1)
SEGMENTED NEUTROPHILS ABSOLUTE COUNT: 7.1 THOU/MM3 (ref 1.8–7.7)
SODIUM SERPL-SCNC: 137 MEQ/L (ref 135–145)
WBC # BLD AUTO: 10.4 THOU/MM3 (ref 4.8–10.8)

## 2024-04-25 PROCEDURE — 99232 SBSQ HOSP IP/OBS MODERATE 35: CPT | Performed by: NURSE PRACTITIONER

## 2024-04-26 LAB
ANION GAP SERPL CALC-SCNC: 10 MEQ/L (ref 8–16)
BASOPHILS ABSOLUTE: 0.1 THOU/MM3 (ref 0–0.1)
BASOPHILS NFR BLD AUTO: 0.7 %
BUN SERPL-MCNC: 29 MG/DL (ref 7–22)
CALCIUM SERPL-MCNC: 9.5 MG/DL (ref 8.5–10.5)
CHLORIDE SERPL-SCNC: 98 MEQ/L (ref 98–111)
CO2 SERPL-SCNC: 28 MEQ/L (ref 23–33)
CREAT SERPL-MCNC: 0.9 MG/DL (ref 0.4–1.2)
DEPRECATED RDW RBC AUTO: 51.7 FL (ref 35–45)
EOSINOPHIL NFR BLD AUTO: 2.6 %
EOSINOPHILS ABSOLUTE: 0.3 THOU/MM3 (ref 0–0.4)
ERYTHROCYTE [DISTWIDTH] IN BLOOD BY AUTOMATED COUNT: 14.9 % (ref 11.5–14.5)
GFR SERPL CREATININE-BSD FRML MDRD: > 90 ML/MIN/1.73M2
GLUCOSE SERPL-MCNC: 133 MG/DL (ref 70–108)
HCT VFR BLD AUTO: 29.8 % (ref 42–52)
HGB BLD-MCNC: 9.3 GM/DL (ref 14–18)
IMM GRANULOCYTES # BLD AUTO: 0.05 THOU/MM3 (ref 0–0.07)
IMM GRANULOCYTES NFR BLD AUTO: 0.5 %
LYMPHOCYTES ABSOLUTE: 2.1 THOU/MM3 (ref 1–4.8)
LYMPHOCYTES NFR BLD AUTO: 21.2 %
MCH RBC QN AUTO: 29.3 PG (ref 26–33)
MCHC RBC AUTO-ENTMCNC: 31.2 GM/DL (ref 32.2–35.5)
MCV RBC AUTO: 94 FL (ref 80–94)
MONOCYTES ABSOLUTE: 0.8 THOU/MM3 (ref 0.4–1.3)
MONOCYTES NFR BLD AUTO: 8.4 %
NEUTROPHILS NFR BLD AUTO: 66.6 %
NRBC BLD AUTO-RTO: 0 /100 WBC
PLATELET # BLD AUTO: 212 THOU/MM3 (ref 130–400)
PMV BLD AUTO: 11.3 FL (ref 9.4–12.4)
POTASSIUM SERPL-SCNC: 4.6 MEQ/L (ref 3.5–5.2)
RBC # BLD AUTO: 3.17 MILL/MM3 (ref 4.7–6.1)
SEGMENTED NEUTROPHILS ABSOLUTE COUNT: 6.5 THOU/MM3 (ref 1.8–7.7)
SODIUM SERPL-SCNC: 136 MEQ/L (ref 135–145)
WBC # BLD AUTO: 9.8 THOU/MM3 (ref 4.8–10.8)

## 2024-04-27 LAB
ANION GAP SERPL CALC-SCNC: 14 MEQ/L (ref 8–16)
BASOPHILS ABSOLUTE: 0.1 THOU/MM3 (ref 0–0.1)
BASOPHILS NFR BLD AUTO: 0.6 %
BUN SERPL-MCNC: 29 MG/DL (ref 7–22)
CALCIUM SERPL-MCNC: 9.5 MG/DL (ref 8.5–10.5)
CHLORIDE SERPL-SCNC: 98 MEQ/L (ref 98–111)
CO2 SERPL-SCNC: 25 MEQ/L (ref 23–33)
CREAT SERPL-MCNC: 0.9 MG/DL (ref 0.4–1.2)
DEPRECATED RDW RBC AUTO: 51.9 FL (ref 35–45)
EOSINOPHIL NFR BLD AUTO: 2.1 %
EOSINOPHILS ABSOLUTE: 0.2 THOU/MM3 (ref 0–0.4)
ERYTHROCYTE [DISTWIDTH] IN BLOOD BY AUTOMATED COUNT: 14.9 % (ref 11.5–14.5)
GFR SERPL CREATININE-BSD FRML MDRD: > 90 ML/MIN/1.73M2
GLUCOSE SERPL-MCNC: 148 MG/DL (ref 70–108)
HCT VFR BLD AUTO: 31.1 % (ref 42–52)
HGB BLD-MCNC: 9.5 GM/DL (ref 14–18)
IMM GRANULOCYTES # BLD AUTO: 0.04 THOU/MM3 (ref 0–0.07)
IMM GRANULOCYTES NFR BLD AUTO: 0.5 %
LYMPHOCYTES ABSOLUTE: 2.1 THOU/MM3 (ref 1–4.8)
LYMPHOCYTES NFR BLD AUTO: 24.4 %
MCH RBC QN AUTO: 29.1 PG (ref 26–33)
MCHC RBC AUTO-ENTMCNC: 30.5 GM/DL (ref 32.2–35.5)
MCV RBC AUTO: 95.1 FL (ref 80–94)
MONOCYTES ABSOLUTE: 0.7 THOU/MM3 (ref 0.4–1.3)
MONOCYTES NFR BLD AUTO: 8.1 %
NEUTROPHILS NFR BLD AUTO: 64.3 %
NRBC BLD AUTO-RTO: 0 /100 WBC
PLATELET # BLD AUTO: 220 THOU/MM3 (ref 130–400)
PMV BLD AUTO: 11.4 FL (ref 9.4–12.4)
POTASSIUM SERPL-SCNC: 4.4 MEQ/L (ref 3.5–5.2)
RBC # BLD AUTO: 3.27 MILL/MM3 (ref 4.7–6.1)
SEGMENTED NEUTROPHILS ABSOLUTE COUNT: 5.5 THOU/MM3 (ref 1.8–7.7)
SODIUM SERPL-SCNC: 137 MEQ/L (ref 135–145)
WBC # BLD AUTO: 8.6 THOU/MM3 (ref 4.8–10.8)

## 2024-04-28 LAB
ANION GAP SERPL CALC-SCNC: 16 MEQ/L (ref 8–16)
BASOPHILS ABSOLUTE: 0.1 THOU/MM3 (ref 0–0.1)
BASOPHILS NFR BLD AUTO: 0.9 %
BUN SERPL-MCNC: 28 MG/DL (ref 7–22)
CALCIUM SERPL-MCNC: 9.7 MG/DL (ref 8.5–10.5)
CHLORIDE SERPL-SCNC: 100 MEQ/L (ref 98–111)
CO2 SERPL-SCNC: 24 MEQ/L (ref 23–33)
CREAT SERPL-MCNC: 0.9 MG/DL (ref 0.4–1.2)
DEPRECATED RDW RBC AUTO: 50.6 FL (ref 35–45)
EOSINOPHIL NFR BLD AUTO: 2.4 %
EOSINOPHILS ABSOLUTE: 0.2 THOU/MM3 (ref 0–0.4)
ERYTHROCYTE [DISTWIDTH] IN BLOOD BY AUTOMATED COUNT: 14.8 % (ref 11.5–14.5)
GFR SERPL CREATININE-BSD FRML MDRD: > 90 ML/MIN/1.73M2
GLUCOSE SERPL-MCNC: 147 MG/DL (ref 70–108)
HCT VFR BLD AUTO: 30.8 % (ref 42–52)
HGB BLD-MCNC: 9.6 GM/DL (ref 14–18)
IMM GRANULOCYTES # BLD AUTO: 0.03 THOU/MM3 (ref 0–0.07)
IMM GRANULOCYTES NFR BLD AUTO: 0.4 %
LYMPHOCYTES ABSOLUTE: 2.1 THOU/MM3 (ref 1–4.8)
LYMPHOCYTES NFR BLD AUTO: 26.2 %
MCH RBC QN AUTO: 29.1 PG (ref 26–33)
MCHC RBC AUTO-ENTMCNC: 31.2 GM/DL (ref 32.2–35.5)
MCV RBC AUTO: 93.3 FL (ref 80–94)
MONOCYTES ABSOLUTE: 0.6 THOU/MM3 (ref 0.4–1.3)
MONOCYTES NFR BLD AUTO: 7.4 %
NEUTROPHILS NFR BLD AUTO: 62.7 %
NRBC BLD AUTO-RTO: 0 /100 WBC
PLATELET # BLD AUTO: 227 THOU/MM3 (ref 130–400)
PMV BLD AUTO: 11.4 FL (ref 9.4–12.4)
POTASSIUM SERPL-SCNC: 4.5 MEQ/L (ref 3.5–5.2)
RBC # BLD AUTO: 3.3 MILL/MM3 (ref 4.7–6.1)
SEGMENTED NEUTROPHILS ABSOLUTE COUNT: 5 THOU/MM3 (ref 1.8–7.7)
SODIUM SERPL-SCNC: 140 MEQ/L (ref 135–145)
WBC # BLD AUTO: 7.9 THOU/MM3 (ref 4.8–10.8)

## 2024-04-29 LAB
ANION GAP SERPL CALC-SCNC: 11 MEQ/L (ref 8–16)
BASOPHILS ABSOLUTE: 0.1 THOU/MM3 (ref 0–0.1)
BASOPHILS NFR BLD AUTO: 0.8 %
BUN SERPL-MCNC: 29 MG/DL (ref 7–22)
CALCIUM SERPL-MCNC: 9.8 MG/DL (ref 8.5–10.5)
CHLORIDE SERPL-SCNC: 98 MEQ/L (ref 98–111)
CO2 SERPL-SCNC: 27 MEQ/L (ref 23–33)
CREAT SERPL-MCNC: 0.9 MG/DL (ref 0.4–1.2)
DEPRECATED RDW RBC AUTO: 51.3 FL (ref 35–45)
EOSINOPHIL NFR BLD AUTO: 2 %
EOSINOPHILS ABSOLUTE: 0.2 THOU/MM3 (ref 0–0.4)
ERYTHROCYTE [DISTWIDTH] IN BLOOD BY AUTOMATED COUNT: 14.8 % (ref 11.5–14.5)
GFR SERPL CREATININE-BSD FRML MDRD: > 90 ML/MIN/1.73M2
GLUCOSE SERPL-MCNC: 159 MG/DL (ref 70–108)
HCT VFR BLD AUTO: 32.2 % (ref 42–52)
HGB BLD-MCNC: 9.8 GM/DL (ref 14–18)
IMM GRANULOCYTES # BLD AUTO: 0.04 THOU/MM3 (ref 0–0.07)
IMM GRANULOCYTES NFR BLD AUTO: 0.5 %
LYMPHOCYTES ABSOLUTE: 1.9 THOU/MM3 (ref 1–4.8)
LYMPHOCYTES NFR BLD AUTO: 24.9 %
MCH RBC QN AUTO: 28.9 PG (ref 26–33)
MCHC RBC AUTO-ENTMCNC: 30.4 GM/DL (ref 32.2–35.5)
MCV RBC AUTO: 95 FL (ref 80–94)
MONOCYTES ABSOLUTE: 0.5 THOU/MM3 (ref 0.4–1.3)
MONOCYTES NFR BLD AUTO: 7.2 %
NEUTROPHILS NFR BLD AUTO: 64.6 %
NRBC BLD AUTO-RTO: 0 /100 WBC
PLATELET # BLD AUTO: 246 THOU/MM3 (ref 130–400)
PMV BLD AUTO: 11 FL (ref 9.4–12.4)
POTASSIUM SERPL-SCNC: 4.7 MEQ/L (ref 3.5–5.2)
RBC # BLD AUTO: 3.39 MILL/MM3 (ref 4.7–6.1)
SEGMENTED NEUTROPHILS ABSOLUTE COUNT: 4.9 THOU/MM3 (ref 1.8–7.7)
SODIUM SERPL-SCNC: 136 MEQ/L (ref 135–145)
WBC # BLD AUTO: 7.6 THOU/MM3 (ref 4.8–10.8)

## 2024-04-30 LAB
ANION GAP SERPL CALC-SCNC: 15 MEQ/L (ref 8–16)
BASOPHILS ABSOLUTE: 0.1 THOU/MM3 (ref 0–0.1)
BASOPHILS NFR BLD AUTO: 0.8 %
BUN SERPL-MCNC: 28 MG/DL (ref 7–22)
CALCIUM SERPL-MCNC: 9.9 MG/DL (ref 8.5–10.5)
CHLORIDE SERPL-SCNC: 99 MEQ/L (ref 98–111)
CO2 SERPL-SCNC: 24 MEQ/L (ref 23–33)
CREAT SERPL-MCNC: 0.9 MG/DL (ref 0.4–1.2)
DEPRECATED RDW RBC AUTO: 50.5 FL (ref 35–45)
EOSINOPHIL NFR BLD AUTO: 2 %
EOSINOPHILS ABSOLUTE: 0.2 THOU/MM3 (ref 0–0.4)
ERYTHROCYTE [DISTWIDTH] IN BLOOD BY AUTOMATED COUNT: 14.9 % (ref 11.5–14.5)
GFR SERPL CREATININE-BSD FRML MDRD: > 90 ML/MIN/1.73M2
GLUCOSE SERPL-MCNC: 160 MG/DL (ref 70–108)
HCT VFR BLD AUTO: 32.5 % (ref 42–52)
HGB BLD-MCNC: 10 GM/DL (ref 14–18)
IMM GRANULOCYTES # BLD AUTO: 0.05 THOU/MM3 (ref 0–0.07)
IMM GRANULOCYTES NFR BLD AUTO: 0.6 %
LYMPHOCYTES ABSOLUTE: 1.8 THOU/MM3 (ref 1–4.8)
LYMPHOCYTES NFR BLD AUTO: 21.3 %
MCH RBC QN AUTO: 28.8 PG (ref 26–33)
MCHC RBC AUTO-ENTMCNC: 30.8 GM/DL (ref 32.2–35.5)
MCV RBC AUTO: 93.7 FL (ref 80–94)
MONOCYTES ABSOLUTE: 0.7 THOU/MM3 (ref 0.4–1.3)
MONOCYTES NFR BLD AUTO: 8 %
NEUTROPHILS NFR BLD AUTO: 67.3 %
NRBC BLD AUTO-RTO: 0 /100 WBC
PLATELET # BLD AUTO: 242 THOU/MM3 (ref 130–400)
PMV BLD AUTO: 11.1 FL (ref 9.4–12.4)
POTASSIUM SERPL-SCNC: 4.3 MEQ/L (ref 3.5–5.2)
RBC # BLD AUTO: 3.47 MILL/MM3 (ref 4.7–6.1)
SEGMENTED NEUTROPHILS ABSOLUTE COUNT: 5.7 THOU/MM3 (ref 1.8–7.7)
SODIUM SERPL-SCNC: 138 MEQ/L (ref 135–145)
WBC # BLD AUTO: 8.5 THOU/MM3 (ref 4.8–10.8)

## 2024-04-30 NOTE — PROGRESS NOTES
Patient:  [facility]    Unit/Bed:RYANN KNDPOOLRM/NONE  MRN: [id number]   PCP: No primary care provider on file.  Date of Admission: 4/11/2024    Assessment and Plan(All pulmonary edema, renal failure, PE, and respiratory failure diagnoses are acute in nature unless otherwise specified):        Acute hypoxic respiratory failure: secondary to impaired airway protection in setting of CVA. GCS is 11, as patient remains nonverbal. However, he is awake and following commands. Continue working with PT/OT/SLP. Continue TM oxygen as tolerated. He has been weaned off mechanical ventilator at this point. Tracheal secretions moderately improved. On scopolamine since 4/16. Will re-evaluate tomorrow, and possibly start PMV trials. Bacterial sputum culture results appear consistent with airway colonization. Follow CXR.  Subacute ischemic stroke / left M1 occlusion: Status post thrombectomy on 3/17/2024 at [facility]. Initial NIHSS was 24 per report. Was not a TNK candidate given prolonged time since last known well. Concern was for cardioembolic source. Echo did not reveal any valvular disease, and bubble study was negative. On telemetry. Course at [facility] c/b cerebral edema, and petechial hemorrhagic conversion at [facility]. Last CT head 3/19 showed stable petechial hemorrhages, with decreasing mass effect and decreasing midline shift. EEG 4/5 showed no epileptiform activity. Remains with flaccid right-sided extremities and aphasia (patient is mute), but he does follow commands and is awake. Noted baseline functional status is impaired given prior stroke in the past.   Trend neurologic exams. PT/OT. SLP. PEG tube for feeds. On aspirin and statin. On lovenox DVT prophylaxis. On telemetry. Primary service following.   Dysphagia: Has PEG for feeds. SLP consulted. Not appropriate for PMV use yet given secretions; re-evaluate tomorrow. Patient is otherwise mute.  Other chronic medical problems: hidradenitis suppurativa, and ischemic 
Patient:  [facility]    Unit/Bed:RYANN KNDPOOLRM/NONE  MRN: [id number]   PCP: No primary care provider on file.  Date of Admission: 4/11/2024    Assessment and Plan(All pulmonary edema, renal failure, PE, and respiratory failure diagnoses are acute in nature unless otherwise specified):        Acute hypoxic respiratory failure: secondary to impaired airway protection in setting of CVA. Neurological status precludes consideration for decannulation at this point. Will need to achieve further neurologic improvement prior to considering this. Continue working with PT/OT/SLP. Continue TM oxygen as tolerated. He has been weaned off mechanical ventilator at this point. Copious tracheal secretions preclude start of PMV trials. On scopolamine; monitor for neurologic effects. No fever or leukocytosis. Respiratory culture with growth of MRSA and pseudomonas stutzeri. Completed prior treatment for pneumonia at [facility]. Appears colonization. Secretions are not malodorous nor discolored. Monitor for fever, leukocytosis.   Subacute ischemic stroke / left M1 occlusion: Status post thrombectomy on 3/17/2024 at outlying hospital. Initial NIHSS was 24 per report. Was not a TNK candidate given prolonged time since last known well. Concern was for cardioembolic source. Echo did not reveal any valvular disease, and bubble study was negative. On telemetry. Course at [facility] c/b cerebral edema, and petechial hemorrhagic conversion at [facility]. Last CT head 3/19 showed stable petechial hemorrhages, with decreasing mass effect and decreasing midline shift. EEG 4/5 showed no epileptiform activity. Remains with flaccid right-sided extremities and aphasia (patient is mute), but he does follow commands and is awake. Noted baseline functional status is impaired given prior stroke in the past.   Trend neurologic exams. PT/OT. SLP. PEG tube for feeds. On aspirin and statin. On lovenox DVT prophylaxis. On telemetry. Primary service following.   Dysphagia: Has PEG 
Patient:  [facility]    Unit/Bed:RYANN KNDPOOLRM/NONE  MRN: [id number]   PCP: No primary care provider on file.  Date of Admission: 4/11/2024    Assessment and Plan(All pulmonary edema, renal failure, PE, and respiratory failure diagnoses are acute in nature unless otherwise specified):        Acute hypoxic respiratory failure: secondary to impaired airway protection in setting of CVA. GCS is 11. However, he is awake and following commands. Continue working with PT/OT/SLP. Continue TM oxygen as tolerated. He has been weaned off mechanical ventilator at this point. Tracheal secretions decreasing. On scopolamine since 4/16. Initiate PMV trials.   Airway colonization: Bacterial sputum culture results appear consistent with airway colonization. Remains afebrile and without leukocytosis. CXR repeatedly shows no consolidation. Secretions decreasing.   Subacute ischemic stroke / left M1 occlusion: Status post thrombectomy on 3/17/2024 at [facility] hospital. Initial NIHSS was 24 per report. Was not a TNK candidate given prolonged time since last known well. Concern was for cardioembolic source. Echo did not reveal any valvular disease, and bubble study was negative. On telemetry; maintaining NSR. Course at [facility] c/b cerebral edema, and petechial hemorrhagic conversion at [facility]. Last CT head 3/19 showed stable petechial hemorrhages, with decreasing mass effect and decreasing midline shift. EEG 4/5 showed no epileptiform activity. Remains with flaccid right-sided extremities and aphasia, dysarthria. Noted baseline functional status is impaired given prior stroke in the past.   Trend neurologic exams. PT/OT. SLP. PEG tube for feeds. Initiate use of PMV as above. On aspirin and statin. On lovenox DVT prophylaxis. On telemetry. Primary service following.  Dysphagia: Has PEG for feeds. SLP consulted. PMV as above.  Other chronic medical problems: hidradenitis suppurativa, and ischemic stroke, HTN, HLD. Noted. Management per primary 
Patient:  [facility]    Unit/Bed:RYANN KNDPOOLRM/NONE  MRN: [id number]   PCP: No primary care provider on file.  Date of Admission: 4/11/2024    Assessment and Plan(All pulmonary edema, renal failure, PE, and respiratory failure diagnoses are acute in nature unless otherwise specified):        Acute hypoxic respiratory failure: secondary to impaired airway protection in setting of CVA. GCS is 12. However, he is awake and following commands. Continue working with PT/OT/SLP. Continue TM oxygen as tolerated. He has been weaned off mechanical ventilator at this point. Tracheal secretions decreasing, but I still have concerns regarding independent airway clearance at this point without maintaining tracheostomy access. On scopolamine since 4/16. Continue PMV during daytime as tolerated; remove at night. Continue trach. Not ready for trach capping yet. Informed that discharge to SNF is being considered. Recommend pulmonary coverage there for continued trach management/weaning.   Airway colonization: Bacterial sputum culture results appear consistent with airway colonization. Remains afebrile and without leukocytosis. CXR repeatedly shows no consolidation. Secretions decreasing.   Subacute ischemic stroke / left M1 occlusion: Status post thrombectomy on 3/17/2024 at [facility]. Initial NIHSS was 24 per report. Was not a TNK candidate given prolonged time since last known well. Concern was for cardioembolic source. Echo did not reveal any valvular disease, and bubble study was negative. On telemetry; maintaining NSR. Course at [facility] c/b cerebral edema, and petechial hemorrhagic conversion at [facility]. Last CT head 3/19 showed stable petechial hemorrhages, with decreasing mass effect and decreasing midline shift. EEG 4/5 showed no epileptiform activity. Remains with flaccid right-sided extremities and aphasia, dysarthria. Noted baseline functional status is impaired given prior stroke in the past.   Trend neurologic exams. 
Patient:  [facility]    Unit/Bed:RYANN KNDPOOLRM/NONE  MRN: [id number]   PCP: No primary care provider on file.  Date of Admission: 4/11/2024    Assessment and Plan(All pulmonary edema, renal failure, PE, and respiratory failure diagnoses are acute in nature unless otherwise specified):        Acute hypoxic respiratory failure: secondary to impaired airway protection in setting of CVA. GCS is 11, as patient remains nonverbal. However, he is awake and following commands. Continue working with PT/OT/SLP. Continue TM oxygen as tolerated. He has been weaned off mechanical ventilator at this point. Tracheal secretions still significant in amount and thickness. On scopolamine since 4/16. Will exchange 8.0 trach for 6.0 cuffless XLT. Will continue to hold off on PMV trials until secretions are improved. Bacterial sputum culture results appear consistent with airway colonization. Remains afebrile and without leukocytosis. Follow CXR; will repeat again Monday. Send procal, CRP.   Subacute ischemic stroke / left M1 occlusion: Status post thrombectomy on 3/17/2024 at [facility]. Initial NIHSS was 24 per report. Was not a TNK candidate given prolonged time since last known well. Concern was for cardioembolic source. Echo did not reveal any valvular disease, and bubble study was negative. On telemetry; maintaining NSR. Course at [facility] c/b cerebral edema, and petechial hemorrhagic conversion at [facility]. Last CT head 3/19 showed stable petechial hemorrhages, with decreasing mass effect and decreasing midline shift. EEG 4/5 showed no epileptiform activity. Remains with flaccid right-sided extremities and aphasia (patient is mute), but he does follow commands and is awake. Noted baseline functional status is impaired given prior stroke in the past.   Trend neurologic exams. PT/OT. SLP. PEG tube for feeds. On aspirin and statin. On lovenox DVT prophylaxis. On telemetry. Primary service following.   Dysphagia: Has PEG for feeds. SLP 
because patient had positive blood culture growth of streptococcus species. However, there was suspicion for a possible sample contamination there, so this remains unclear. Repeat blood cultures after antimicrobial completion on 3/30/24 were negative. Additionally, patient had an upper GI bleed, found to have several ulcers in the duodenum on EGD 3/25/2024, treated with epinephrine injections and clipping. He required 1 pRBC on 3/29/24. Otherwise, patient was weaned to trach collar, with intermittent vent support for rest. He was discharged to [facility] 4/11/24 for continued trach/vent weaning if possible, and further functional rehabilitation. Patient was admitted to hospitalist Dr. Behl.    Out pulmonary services were consulted 4/12/24 for management of acute respiratory failure, and tracheostomy.     For further details, please see assessment and plan.      ROS: Unable to assess as patient is severely aphasic/mute.     PMH:  Per HPI  SHX: None on file.  Patient noncontributory.  FHX: None on file.  Patient noncontributory.  Allergies: Penicillins.    Medications:  MAR printed and reviewed.     Vital Signs:   T: 98.2: P: 71 RR: 18 B/P: 150/48: FiO2: 21% (via TM @ 5 lpm): O2 Sat:94%: I/O: net -522 mL last 24h GCS: 11 (trach, nonverbal)  There is no height or weight on file to calculate BMI..  No data recorded     General:   Acute on chronically ill adult male. Appears stated age.   HEENT:  normocephalic and atraumatic.  No scleral icterus. PERR  Neck: supple.  No Thyromegaly. Trach well-seated, secured. Copious tracheal secretions, thick and white.   Lungs: rhonchi to auscultation. Unlabored. Normal resp rate, pattern. No retractions  Cardiac: RRR.  No JVD.  Abdomen: soft.  Nontender. Obese. PEG tube.   Extremities:  No clubbing, cyanosis, or edema x 4.    Vasculature: capillary refill < 3 seconds. Palpable dorsalis pedis pulses.  Skin:  warm and dry.  Psych:  Alert. Nonverbal.  Affect appropriate  Lymph:  No 
cultures after antimicrobial completion on 3/30/24 were negative. Additionally, patient had an upper GI bleed, found to have several ulcers in the duodenum on EGD 3/25/2024, treated with epinephrine injections and clipping. He required 1 pRBC on 3/29/24. Otherwise, patient was weaned to trach collar, with intermittent vent support for rest. He was discharged to [facility] 4/11/24 for continued trach/vent weaning if possible, and further functional rehabilitation. Patient was admitted to hospitalist Dr. Behl.    Out pulmonary services were consulted 4/12/24 for management of acute respiratory failure, and tracheostomy.     For further details, please see assessment and plan.      ROS: Unable to assess as patient is severely aphasic and dysarthric.     PMH:  Per HPI  SHX: None on file.  Patient noncontributory.  FHX: None on file.  Patient noncontributory.  Allergies: Penicillins.    Medications:  MAR printed and reviewed.     Vital Signs:   T: 98.2: P: 70 RR: 18 B/P: 132/66: FiO2: 21% (via TM): O2 Sat: 97%: I/O: net +216 mL last 24h GCS: 11 (trach, nonverbal)  There is no height or weight on file to calculate BMI..  No data recorded     General:   Acute on chronically ill adult male. Appears stated age. Patient watching TV. Up in specialized chair.   HEENT:  normocephalic and atraumatic.  No scleral icterus. PERR  Neck: supple.  No Thyromegaly. Trach well-seated, secured. Moderate amount of tracheal secretions, thick and white; improving.   Lungs: clear to auscultation. Unlabored. Normal resp rate, pattern. No retractions  Cardiac: RRR.  No JVD.  Abdomen: soft.  Nontender. Obese. PEG tube.   Extremities:  No clubbing, cyanosis, or edema x 4.    Vasculature: capillary refill < 3 seconds. Palpable dorsalis pedis pulses.  Skin:  warm and dry.  Psych:  Alert. Nonverbal.  Affect appropriate  Lymph:  No supraclavicular adenopathy.  Neurologic:  No seizures. Alert. Intact cough. Right-sided hemiparesis, flaccidity. Right 
growth of streptococcus species. However, there was suspicion for a possible sample contamination there, so this remains unclear. Repeat blood cultures after antimicrobial completion on 3/30/24 were negative. Additionally, patient had an upper GI bleed, found to have several ulcers in the duodenum on EGD 3/25/2024, treated with epinephrine injections and clipping. He required 1 pRBC on 3/29/24. Otherwise, patient was weaned to trach collar, with intermittent vent support for rest. He was discharged to [facility] 4/11/24 for continued trach/vent weaning if possible, and further functional rehabilitation. Patient was admitted to hospitalist Dr. Behl.    Out pulmonary services were consulted 4/12/24 for management of acute respiratory failure, and tracheostomy.     For further details, please see assessment and plan.      ROS: Unable to assess as patient is severely aphasic/mute.     PMH:  Per HPI  SHX: None on file.  Patient noncontributory.  FHX: None on file.  Patient noncontributory.  Allergies: Penicillins.    Medications:  MAR printed and reviewed.     Vital Signs:   T: 98.7: P: 78 RR: 20 B/P: 130/48: FiO2: 21% (via TM @ 5 lpm): O2 Sat:100%: I/O: net +245 mL last 24h GCS: 11 (trach, nonverbal)  There is no height or weight on file to calculate BMI..  No data recorded     General:   Acute on chronically ill adult male. Appears stated age. Up in specialized chair.  HEENT:  normocephalic and atraumatic.  No scleral icterus. PERR  Neck: supple.  No Thyromegaly. Trach well-seated, secured. Moderate tracheal secretions, thick and white.   Lungs: clear to auscultation. Unlabored. Normal resp rate, pattern. No retractions  Cardiac: RRR.  No JVD.  Abdomen: soft.  Nontender. Obese. PEG tube.   Extremities:  No clubbing, cyanosis, or edema x 4.    Vasculature: capillary refill < 3 seconds. Palpable dorsalis pedis pulses.  Skin:  warm and dry.  Psych:  Alert. Nonverbal.  Affect appropriate  Lymph:  No supraclavicular 
no indicators present

## 2024-05-01 LAB
ANION GAP SERPL CALC-SCNC: 10 MEQ/L (ref 8–16)
BASOPHILS ABSOLUTE: 0.1 THOU/MM3 (ref 0–0.1)
BASOPHILS NFR BLD AUTO: 0.8 %
BUN SERPL-MCNC: 26 MG/DL (ref 7–22)
CALCIUM SERPL-MCNC: 9.7 MG/DL (ref 8.5–10.5)
CHLORIDE SERPL-SCNC: 97 MEQ/L (ref 98–111)
CO2 SERPL-SCNC: 27 MEQ/L (ref 23–33)
CREAT SERPL-MCNC: 0.9 MG/DL (ref 0.4–1.2)
DEPRECATED RDW RBC AUTO: 50.8 FL (ref 35–45)
EOSINOPHIL NFR BLD AUTO: 2.3 %
EOSINOPHILS ABSOLUTE: 0.2 THOU/MM3 (ref 0–0.4)
ERYTHROCYTE [DISTWIDTH] IN BLOOD BY AUTOMATED COUNT: 14.9 % (ref 11.5–14.5)
GFR SERPL CREATININE-BSD FRML MDRD: > 90 ML/MIN/1.73M2
GLUCOSE SERPL-MCNC: 142 MG/DL (ref 70–108)
HCT VFR BLD AUTO: 32.9 % (ref 42–52)
HGB BLD-MCNC: 10 GM/DL (ref 14–18)
IMM GRANULOCYTES # BLD AUTO: 0.03 THOU/MM3 (ref 0–0.07)
IMM GRANULOCYTES NFR BLD AUTO: 0.3 %
LYMPHOCYTES ABSOLUTE: 2.2 THOU/MM3 (ref 1–4.8)
LYMPHOCYTES NFR BLD AUTO: 25.1 %
MCH RBC QN AUTO: 28.6 PG (ref 26–33)
MCHC RBC AUTO-ENTMCNC: 30.4 GM/DL (ref 32.2–35.5)
MCV RBC AUTO: 94 FL (ref 80–94)
MONOCYTES ABSOLUTE: 0.6 THOU/MM3 (ref 0.4–1.3)
MONOCYTES NFR BLD AUTO: 7.1 %
NEUTROPHILS ABSOLUTE: 5.7 THOU/MM3 (ref 1.8–7.7)
NEUTROPHILS NFR BLD AUTO: 64.4 %
NRBC BLD AUTO-RTO: 0 /100 WBC
PLATELET # BLD AUTO: 248 THOU/MM3 (ref 130–400)
PMV BLD AUTO: 11 FL (ref 9.4–12.4)
POTASSIUM SERPL-SCNC: 4.2 MEQ/L (ref 3.5–5.2)
RBC # BLD AUTO: 3.5 MILL/MM3 (ref 4.7–6.1)
SODIUM SERPL-SCNC: 134 MEQ/L (ref 135–145)
WBC # BLD AUTO: 8.8 THOU/MM3 (ref 4.8–10.8)

## 2024-05-02 LAB
ANION GAP SERPL CALC-SCNC: 13 MEQ/L (ref 8–16)
BASOPHILS ABSOLUTE: 0.1 THOU/MM3 (ref 0–0.1)
BASOPHILS NFR BLD AUTO: 0.8 %
BUN SERPL-MCNC: 26 MG/DL (ref 7–22)
CALCIUM SERPL-MCNC: 9.6 MG/DL (ref 8.5–10.5)
CHLORIDE SERPL-SCNC: 98 MEQ/L (ref 98–111)
CO2 SERPL-SCNC: 26 MEQ/L (ref 23–33)
CREAT SERPL-MCNC: 0.9 MG/DL (ref 0.4–1.2)
DEPRECATED RDW RBC AUTO: 50.9 FL (ref 35–45)
EOSINOPHIL NFR BLD AUTO: 2.3 %
EOSINOPHILS ABSOLUTE: 0.2 THOU/MM3 (ref 0–0.4)
ERYTHROCYTE [DISTWIDTH] IN BLOOD BY AUTOMATED COUNT: 14.9 % (ref 11.5–14.5)
GFR SERPL CREATININE-BSD FRML MDRD: > 90 ML/MIN/1.73M2
GLUCOSE SERPL-MCNC: 141 MG/DL (ref 70–108)
HCT VFR BLD AUTO: 32.6 % (ref 42–52)
HGB BLD-MCNC: 10 GM/DL (ref 14–18)
IMM GRANULOCYTES # BLD AUTO: 0.04 THOU/MM3 (ref 0–0.07)
IMM GRANULOCYTES NFR BLD AUTO: 0.5 %
LYMPHOCYTES ABSOLUTE: 2.3 THOU/MM3 (ref 1–4.8)
LYMPHOCYTES NFR BLD AUTO: 26.9 %
MCH RBC QN AUTO: 28.6 PG (ref 26–33)
MCHC RBC AUTO-ENTMCNC: 30.7 GM/DL (ref 32.2–35.5)
MCV RBC AUTO: 93.1 FL (ref 80–94)
MONOCYTES ABSOLUTE: 0.7 THOU/MM3 (ref 0.4–1.3)
MONOCYTES NFR BLD AUTO: 7.8 %
NEUTROPHILS ABSOLUTE: 5.3 THOU/MM3 (ref 1.8–7.7)
NEUTROPHILS NFR BLD AUTO: 61.7 %
NRBC BLD AUTO-RTO: 0 /100 WBC
PLATELET # BLD AUTO: 253 THOU/MM3 (ref 130–400)
PMV BLD AUTO: 11 FL (ref 9.4–12.4)
POTASSIUM SERPL-SCNC: 4.2 MEQ/L (ref 3.5–5.2)
RBC # BLD AUTO: 3.5 MILL/MM3 (ref 4.7–6.1)
SODIUM SERPL-SCNC: 137 MEQ/L (ref 135–145)
WBC # BLD AUTO: 8.6 THOU/MM3 (ref 4.8–10.8)